# Patient Record
Sex: MALE | Race: WHITE | Employment: FULL TIME | URBAN - METROPOLITAN AREA
[De-identification: names, ages, dates, MRNs, and addresses within clinical notes are randomized per-mention and may not be internally consistent; named-entity substitution may affect disease eponyms.]

---

## 2017-09-25 ENCOUNTER — HOSPITAL ENCOUNTER (OUTPATIENT)
Dept: LAB | Age: 57
Discharge: HOME | End: 2017-09-25

## 2017-09-25 PROCEDURE — 88305 TISSUE EXAM BY PATHOLOGIST: CPT | Performed by: INTERNAL MEDICINE

## 2018-08-07 ENCOUNTER — HOSPITAL ENCOUNTER (INPATIENT)
Age: 58
LOS: 1 days | Discharge: HOME | DRG: 247 | End: 2018-08-09
Attending: INTERNAL MEDICINE | Admitting: INTERNAL MEDICINE
Payer: COMMERCIAL

## 2018-08-07 PROBLEM — I10 ESSENTIAL HYPERTENSION WITH GOAL BLOOD PRESSURE LESS THAN 130/85: Status: ACTIVE | Noted: 2018-08-07

## 2018-08-07 PROBLEM — I20.0 UNSTABLE ANGINA (HCC): Status: ACTIVE | Noted: 2018-08-07

## 2018-08-07 PROBLEM — R94.31 ABNORMAL EKG: Status: ACTIVE | Noted: 2018-08-07

## 2018-08-07 PROBLEM — R07.9 CHEST PAIN: Status: ACTIVE | Noted: 2018-08-07

## 2018-08-07 PROBLEM — E11.9 TYPE 2 DIABETES MELLITUS WITHOUT COMPLICATION, WITHOUT LONG-TERM CURRENT USE OF INSULIN (HCC): Status: ACTIVE | Noted: 2018-08-07

## 2018-08-07 LAB
ANION GAP SERPL CALC-SCNC: 7 MMOL/L (ref 7–16)
BUN SERPL-MCNC: 15 MG/DL (ref 8–23)
CALCIUM SERPL-MCNC: 9.3 MG/DL (ref 8.3–10.4)
CHLORIDE SERPL-SCNC: 105 MMOL/L (ref 98–107)
CO2 SERPL-SCNC: 27 MMOL/L (ref 21–32)
CREAT SERPL-MCNC: 1.16 MG/DL (ref 0.8–1.5)
ERYTHROCYTE [DISTWIDTH] IN BLOOD BY AUTOMATED COUNT: 13.3 %
GLUCOSE BLD STRIP.AUTO-MCNC: 118 MG/DL (ref 65–100)
GLUCOSE SERPL-MCNC: 129 MG/DL (ref 65–100)
HCT VFR BLD AUTO: 42 % (ref 41.1–50.3)
HGB BLD-MCNC: 14.2 G/DL (ref 13.6–17.2)
MCH RBC QN AUTO: 30.7 PG (ref 26.1–32.9)
MCHC RBC AUTO-ENTMCNC: 33.8 G/DL (ref 31.4–35)
MCV RBC AUTO: 90.9 FL (ref 79.6–97.8)
NRBC # BLD: 0 K/UL (ref 0–0.2)
PLATELET # BLD AUTO: 315 K/UL (ref 150–450)
PMV BLD AUTO: 10.6 FL (ref 9.4–12.3)
POTASSIUM SERPL-SCNC: 4.7 MMOL/L (ref 3.5–5.1)
RBC # BLD AUTO: 4.62 M/UL (ref 4.23–5.6)
SODIUM SERPL-SCNC: 139 MMOL/L (ref 136–145)
TROPONIN I SERPL-MCNC: 1.99 NG/ML (ref 0.02–0.05)
WBC # BLD AUTO: 7.3 K/UL (ref 4.3–11.1)

## 2018-08-07 PROCEDURE — 99218 HC RM OBSERVATION: CPT

## 2018-08-07 PROCEDURE — 82962 GLUCOSE BLOOD TEST: CPT

## 2018-08-07 PROCEDURE — 84484 ASSAY OF TROPONIN QUANT: CPT

## 2018-08-07 PROCEDURE — 74011250637 HC RX REV CODE- 250/637: Performed by: NURSE PRACTITIONER

## 2018-08-07 PROCEDURE — 80048 BASIC METABOLIC PNL TOTAL CA: CPT

## 2018-08-07 PROCEDURE — 85027 COMPLETE CBC AUTOMATED: CPT

## 2018-08-07 RX ORDER — RANITIDINE 150 MG/1
150 TABLET, FILM COATED ORAL 2 TIMES DAILY
COMMUNITY
End: 2019-11-14

## 2018-08-07 RX ORDER — ACETAMINOPHEN 325 MG/1
650 TABLET ORAL
Status: DISCONTINUED | OUTPATIENT
Start: 2018-08-07 | End: 2018-08-08 | Stop reason: SDUPTHER

## 2018-08-07 RX ORDER — ATORVASTATIN CALCIUM 10 MG/1
20 TABLET, FILM COATED ORAL DAILY
Status: DISCONTINUED | OUTPATIENT
Start: 2018-08-08 | End: 2018-08-09 | Stop reason: HOSPADM

## 2018-08-07 RX ORDER — MORPHINE SULFATE 2 MG/ML
2 INJECTION, SOLUTION INTRAMUSCULAR; INTRAVENOUS
Status: DISCONTINUED | OUTPATIENT
Start: 2018-08-07 | End: 2018-08-08 | Stop reason: SDUPTHER

## 2018-08-07 RX ORDER — FAMOTIDINE 20 MG/1
20 TABLET, FILM COATED ORAL 2 TIMES DAILY
Status: DISCONTINUED | OUTPATIENT
Start: 2018-08-07 | End: 2018-08-09 | Stop reason: HOSPADM

## 2018-08-07 RX ORDER — METOPROLOL TARTRATE 25 MG/1
25 TABLET, FILM COATED ORAL EVERY 12 HOURS
Status: DISCONTINUED | OUTPATIENT
Start: 2018-08-07 | End: 2018-08-09 | Stop reason: HOSPADM

## 2018-08-07 RX ORDER — SODIUM CHLORIDE 9 MG/ML
100 INJECTION, SOLUTION INTRAVENOUS CONTINUOUS
Status: DISCONTINUED | OUTPATIENT
Start: 2018-08-08 | End: 2018-08-09 | Stop reason: HOSPADM

## 2018-08-07 RX ORDER — SODIUM CHLORIDE 0.9 % (FLUSH) 0.9 %
5-10 SYRINGE (ML) INJECTION AS NEEDED
Status: DISCONTINUED | OUTPATIENT
Start: 2018-08-07 | End: 2018-08-09 | Stop reason: HOSPADM

## 2018-08-07 RX ORDER — HYDROCODONE BITARTRATE AND ACETAMINOPHEN 5; 325 MG/1; MG/1
1 TABLET ORAL
Status: DISCONTINUED | OUTPATIENT
Start: 2018-08-07 | End: 2018-08-09 | Stop reason: HOSPADM

## 2018-08-07 RX ORDER — GUAIFENESIN 100 MG/5ML
81 LIQUID (ML) ORAL DAILY
Status: DISCONTINUED | OUTPATIENT
Start: 2018-08-08 | End: 2018-08-09 | Stop reason: HOSPADM

## 2018-08-07 RX ORDER — ACETAMINOPHEN 160 MG/5ML
200 SUSPENSION, ORAL (FINAL DOSE FORM) ORAL DAILY
COMMUNITY

## 2018-08-07 RX ORDER — LISINOPRIL 20 MG/1
20 TABLET ORAL DAILY
Status: DISCONTINUED | OUTPATIENT
Start: 2018-08-08 | End: 2018-08-09 | Stop reason: HOSPADM

## 2018-08-07 RX ORDER — NITROGLYCERIN 0.4 MG/1
0.4 TABLET SUBLINGUAL
Status: DISCONTINUED | OUTPATIENT
Start: 2018-08-07 | End: 2018-08-08 | Stop reason: SDUPTHER

## 2018-08-07 RX ORDER — INSULIN LISPRO 100 [IU]/ML
INJECTION, SOLUTION INTRAVENOUS; SUBCUTANEOUS
Status: DISCONTINUED | OUTPATIENT
Start: 2018-08-07 | End: 2018-08-09 | Stop reason: HOSPADM

## 2018-08-07 RX ORDER — BISMUTH SUBSALICYLATE 262 MG
1 TABLET,CHEWABLE ORAL DAILY
COMMUNITY

## 2018-08-07 RX ADMIN — FAMOTIDINE 20 MG: 20 TABLET ORAL at 18:24

## 2018-08-07 RX ADMIN — NITROGLYCERIN 1 INCH: 20 OINTMENT TOPICAL at 18:23

## 2018-08-07 RX ADMIN — METOPROLOL TARTRATE 25 MG: 25 TABLET ORAL at 20:47

## 2018-08-07 NOTE — PROGRESS NOTES
Bedside and Verbal shift change report given to Rox Valles RN (oncoming nurse) by self Alisiaenma Buttsg nurse). Report included the following information SBAR, Kardex, MAR and Recent Results.

## 2018-08-07 NOTE — PROGRESS NOTES
Bedside and Verbal shift change report given to self (oncoming nurse) by Marquis Allison (offgoing nurse). Report included the following information SBAR, Kardex and Recent Results.

## 2018-08-07 NOTE — IP AVS SNAPSHOT
[address] [address] [facility] [address] 
[phone] Patient: Abbie Hinds MRN: [id number] TLY:[birth year] About your hospitalization You were admitted on:  August 7, 2018 You last received care in the:  [facility] 3 CLINICAL OBSERVATION You were discharged on:  August 9, 2018 Why you were hospitalized Your primary diagnosis was:  Not on File Your diagnoses also included:  Unstable Angina (Hcc), Nstemi (Non-St Elevated Myocardial Infarction) (Hcc) Follow-up Information Follow up With Details Comments Contact Info Marit Nissen, MD On 8/23/2018 TC-14 at 2pm in [place] office  [address] [facility] [id number] [phone] Hugh Carpio MD  As needed [address] Partner MD 
[facility] [address] 
[phone] Your Scheduled Appointments Thursday August 23, 2018  2:15 PM EDT TRANSITIONAL CARE with Marit Nissen, MD  
[facility] ([address] [place] [address]) [address] 
[address] [place] [facility]  
[phone] Discharge Orders Procedure Order Date Status Priority Quantity Spec Type Associated Dx REFERRAL TO CARDIAC [facility] 08/09/18 1619 Normal Routine 1 A check lauren indicates which time of day the medication should be taken. My Medications START taking these medications Instructions Each Dose to Equal  
 Morning Noon Evening Bedtime  
 aspirin 81 mg chewable tablet Start taking on:  8/10/2018 Replaces:  aspirin 325 mg tablet Take 1 Tab by mouth daily. 81 mg  
    
  
   
   
   
  
 metoprolol tartrate 25 mg tablet Commonly known as:  LOPRESSOR Take 1 Tab by mouth every twelve (12) hours. 25 mg  
    
  
   
   
  
   
  
 nitroglycerin 0.4 mg SL tablet Commonly known as:  NITROSTAT  
   
 1 Tab by SubLINGual route every five (5) minutes as needed for Chest Pain. Up to 3 doses. 0.4 mg  
    
   
   
   
  
 ticagrelor 90 mg tablet Commonly known as:  [place] Take 1 Tab by mouth every twelve (12) hours every twelve (12) hours. 90 mg CONTINUE taking these medications Instructions Each Dose to Equal  
 Morning Noon Evening Bedtime  
 atorvastatin 20 mg tablet Commonly known as:  LIPITOR Take  by mouth daily. CO Q-10 200 mg capsule Generic drug:  coenzyme Q-10 Take 200 mg by mouth daily. 200 mg  
    
  
   
   
   
  
 fish oil-dha-epa 1,200-144-216 mg Cap Take  by mouth. fluocinoNIDE 0.05 % ointment Commonly known as:  LIDEX GARLIC PO Take  by mouth.  
     
   
   
   
  
 lisinopril 20 mg tablet Commonly known as:  Melba Lights Take 20 mg by mouth daily. 20 mg  
    
  
   
   
   
  
 metFORMIN 500 mg Tg24 24 hour tablet Commonly known asMira Sprain ER Take  by mouth.  
     
   
   
   
  
 multivitamin tablet Commonly known as:  ONE A DAY Take 1 Tab by mouth daily. 1 Tab Omega-3 Fatty Acids 60- mg Cpdr  
   
 Take  by mouth. raNITIdine 150 mg tablet Commonly known as:  ZANTAC Take 150 mg by mouth two (2) times a day. 150 mg  
    
  
   
   
  
   
  
 saw palmetto fruit 450 mg Cap Take  by mouth two (2) times a day. EGGD-EMWV-MTU-QFC-WBU-ZQEI-HOR PO Take  by mouth. STOP taking these medications   
 aspirin 325 mg tablet Commonly known as:  ASPIRIN Replaced by:  aspirin 81 mg chewable tablet CIALIS 5 mg tablet Generic drug:  tadalafil Where to Get Your Medications Information on where to get these meds will be given to you by the nurse or doctor. ! Ask your nurse or doctor about these medications  
  metoprolol tartrate 25 mg tablet  
 nitroglycerin 0.4 mg SL tablet  
 ticagrelor 90 mg tablet Discharge Instructions Cardiac Catheterization/Angiography Discharge Instructions *Check the puncture site frequently for swelling or bleeding. If you see any bleeding, lie down and apply pressure over the area with a clean town or washcloth. Notify your doctor for any redness, swelling, drainage or oozing from the puncture site. Notify your doctor for any fever or chills. *If the leg or arm with the puncture becomes cold, numb or painful, call Dr Laci Moe at  [phone]. *Activity should be limited for the next 48 hours. Climb stairs as little as possible and avoid any stooping, bending or strenuous activity for 48 hours. No heavy lifting (anything over 10 pounds) for three days. *Do not drive for 48 hours. *You may resume your usual diet. Drink more fluids than usual. 
 
*Have a responsible person drive you home and stay with you for at least 24 hours after your heart catheterization/angiography. *You may remove the bandage from your Right and Arm in 24 hours. You may shower in 24 hours. No tub baths, hot tubs or swimming for one week. Do not place any lotions, creams, powders, ointments over the puncture site for one week. You may place a clean band-aid over the puncture site each day for 5 days. Change this daily. Percutaneous Coronary Intervention: What to Expect at [facility] Your Recovery Percutaneous coronary intervention (PCI) is the name for procedures that are used to open a narrowed or blocked coronary artery. The two most common PCI procedures are coronary angioplasty and coronary stent placement. Your groin or arm may have a bruise and feel sore for a day or two after a percutaneous coronary intervention (PCI). You can do light activities around the house, but nothing strenuous for several days. This care sheet gives you a general idea about how long it will take for you to recover. But each person recovers at a different pace. Follow the steps below to get better as quickly as possible. How can you care for yourself at home? Activity 
  · If the doctor gave you a sedative: ¨ For 24 hours, don't do anything that requires attention to detail. It takes time for the medicine's effects to completely wear off. ¨ For your safety, do not drive or operate any machinery that could be dangerous. Wait until the medicine wears off and you can think clearly and react easily.  
  · Do not do strenuous exercise and do not lift, pull, or push anything heavy until your doctor says it is okay. This may be for a day or two. You can walk around the house and do light activity, such as cooking.  
  · If the catheter was placed in your groin, try not to walk up stairs for the first couple of days.  
  · If the catheter was placed in your arm near your wrist, do not bend your wrist deeply for the first couple of days. Be careful using your hand to get into and out of a chair or bed.  
  · Carry your stent identification card with you at all times.  
  · If your doctor recommends it, get more exercise. Walking is a good choice. Bit by bit, increase the amount you walk every day. Try for at least 30 minutes on most days of the week. Diet 
  · Drink plenty of fluids to help your body flush out the dye. If you have kidney, heart, or liver disease and have to limit fluids, talk with your doctor before you increase the amount of fluids you drink.  
  · Keep eating a heart-healthy diet that has lots of fruits, vegetables, and whole grains. If you have not been eating this way, talk to your doctor. You also may want to talk to a dietitian. This expert can help you to learn about healthy foods and plan meals. Medicines 
  · Your doctor will tell you if and when you can restart your medicines. He or she will also give you instructions about taking any new medicines.  
  · If you take blood thinners, such as warfarin (Coumadin), clopidogrel (Plavix), or aspirin, be sure to talk to your doctor. He or she will tell you if and when to start taking those medicines again. Make sure that you understand exactly what your doctor wants you to do.  
  · Your doctor will prescribe blood-thinning medicines. You will likely take aspirin plus another antiplatelet, such as clopidogrel (Plavix). It is very important that you take these medicines exactly as directed. These medicines help keep the coronary artery open and reduce your risk of a heart attack.  
  · Call your doctor if you think you are having a problem with your medicine.  
 Care of the catheter site 
  · For 1 or 2 days, keep a bandage over the spot where the catheter was inserted. The bandage probably will fall off in this time.  
  · Put ice or a cold pack on the area for 10 to 20 minutes at a time to help with soreness or swelling. Put a thin cloth between the ice and your skin.  
  · You may shower 24 to 48 hours after the procedure, if your doctor okays it. Pat the incision dry.  
  · Do not soak the catheter site until it is healed. Don't take a bath for 1 week, or until your doctor tells you it is okay.  
  · If you are bleeding, lie down and press on the area for 15 minutes to try to make it stop. If the bleeding does not stop, call your doctor or seek immediate medical care. Follow-up care is a key part of your treatment and safety. Be sure to make and go to all appointments, and call your doctor if you are having problems. It's also a good idea to know your test results and keep a list of the medicines you take. When should you call for help? Call 911 anytime you think you may need emergency care. For example, call if: 
  · You passed out (lost consciousness).  
  · You have severe trouble breathing.   · You have sudden chest pain and shortness of breath, or you cough up blood.  
  · You have symptoms of a heart attack, such as: ¨ Chest pain or pressure. ¨ Sweating. ¨ Shortness of breath. ¨ Nausea or vomiting. ¨ Pain that spreads from the chest to the neck, jaw, or one or both shoulders or arms. ¨ Dizziness or lightheadedness. ¨ A fast or uneven pulse. After calling 911, chew 1 adult-strength aspirin. Wait for an ambulance. Do not try to drive yourself.  
  · You have been diagnosed with angina, and you have angina symptoms that do not go away with rest or are not getting better within 5 minutes after you take one dose of nitroglycerin.  
 Call your doctor now or seek immediate medical care if: 
  · You are bleeding from the area where the catheter was put in your artery.  
  · You have a fast-growing, painful lump at the catheter site.  
  · You have signs of infection, such as: 
¨ Increased pain, swelling, warmth, or redness. ¨ Red streaks leading from the catheter site. ¨ Pus draining from the catheter site. ¨ A fever.  
  · Your leg or arm looks blue or feels cold, numb, or tingly.  
 Watch closely for changes in your health, and be sure to contact your doctor if you have any problems. Where can you learn more? Go to http://bailey-sury.info/. Enter Z194 in the search box to learn more about \"Percutaneous Coronary Intervention: What to Expect at Home. \" Current as of: December 6, 2017 Content Version: 11.7 © 4209-1506 [employer]. Care instructions adapted under license by [employer] (which disclaims liability or warranty for this information). If you have questions about a medical condition or this instruction, always ask your healthcare professional. [address] [address] any warranty or liability for your use of this information. [employer] Announcement We are excited to announce that we are making your provider's discharge notes available to you in Plazapoints (Cuponium). You will see these notes when they are completed and signed by the physician that discharged you from your recent hospital stay. If you have any questions or concerns about any information you see in Plazapoints (Cuponium), please call the Health Information Department where you were seen or reach out to your Primary Care Provider for more information about your plan of care. Introducing [facility] & HEALTH SERVICES! [employer] introduces Plazapoints (Cuponium) patient portal. Now you can access parts of your medical record, email your doctor's office, and request medication refills online. 1. In your internet browser, go to [email or web address] 2. Click on the First Time User? Click Here link in the Sign In box. You will see the New Member Sign Up page. 3. Enter your Plazapoints (Cuponium) Access Code exactly as it appears below. You will not need to use this code after youve completed the sign-up process. If you do not sign up before the expiration date, you must request a new code. · Plazapoints (Cuponium) Access Code: [email or web address] Expires: 11/5/2018  8:39 AM 
 
4. Enter the last four digits of your Social Security Number (xxxx) and Date of Birth (mm/dd/yyyy) as indicated and click Submit. You will be taken to the next sign-up page. 5. Create a Plazapoints (Cuponium) ID. This will be your Plazapoints (Cuponium) login ID and cannot be changed, so think of one that is secure and easy to remember. 6. Create a Plazapoints (Cuponium) password. You can change your password at any time. 7. Enter your Password Reset Question and Answer. This can be used at a later time if you forget your password. 8. Enter your e-mail address. You will receive e-mail notification when new information is available in [address] 9. Click Sign Up. You can now view and download portions of your medical record.  
10. Click the Download Summary menu link to download a portable copy of your medical information. If you have questions, please visit the Frequently Asked Questions section of the [employer] website. Remember, yuback is NOT to be used for urgent needs. For medical emergencies, dial 911. Now available from your iPhone and Android! Introducing Rey Nuno As a [facility] patient, I wanted to make you aware of our electronic visit tool called Rey Nuno. Buzzoo 24/7 allows you to connect within minutes with a medical provider 24 hours a day, seven days a week via a mobile device or tablet or logging into a secure website from your computer. You can access Rey Nuno from anywhere in the [place]. A virtual visit might be right for you when you have a simple condition and feel like you just dont want to get out of bed, or cant get away from work for an appointment, when your regular [facility] provider is not available (evenings, weekends or holidays), or when youre out of town and need minor care. Electronic visits cost only $49 and if the [employer] provider determines a prescription is needed to treat your condition, one can be electronically transmitted to a nearby pharmacy*. Please take a moment to enroll today if you have not already done so. The enrollment process is free and takes just a few minutes. To enroll, please download the [employer] avery to your tablet or phone, or visit [email or web address] to enroll on your computer. And, as an [address] [place] [address] patient with a [employer] account, the results of your visits will be scanned into your electronic medical record and your primary care provider will be able to view the scanned results. We urge you to continue to see your regular [facility] provider for your ongoing medical care.   And while your primary care provider may not be the one available when you seek a Rey Nuno virtual visit, the peace of mind you get from getting a real diagnosis real time can be priceless. For more information on Rey Nuno, view our Frequently Asked Questions (FAQs) at [email or web address]. Sincerely, 
 
Maty Olivarez MD 
Chief Medical Officer [address] *:  certain medications cannot be prescribed via Rey Nuno Providers Seen During Your Hospitalization Provider Specialty Primary office phone Butch Doyle MD Cardiology [phone] Your Primary Care Physician (PCP) Primary Care Physician Office Phone Office Fax Nadia Rosaless [phone] You are allergic to the following Allergen Reactions Codeine Itching Recent Documentation Weight BMI Smoking Status 71.2 kg 24.57 kg/m2 Never Smoker Emergency Contacts Name Discharge Info Relation Home Work Mobile Ciara Gonzalez Spouse [3] [phone] Patient Belongings The following personal items are in your possession at time of discharge: 
  Dental Appliances: Uppers, Lowers, Partials (full upper and partial lower)  Visual Aid: With patient      Home Medications: None   Jewelry: Ring  Clothing: Pants, Shirt, Sweater    Other Valuables: Cell Phone (2) Discharge Instructions Attachments/References CARDIAC REHABILITATION (ENGLISH) Patient Handouts Cardiac Rehabilitation: Care Instructions Your Care Instructions Cardiac rehabilitation is a program for people who have a heart problem, such as a heart attack, heart failure, or a heart valve disease. The program includes exercise, lifestyle changes, education, and emotional support. Cardiac rehab can help you improve the quality of your life through better overall health. It can help you lose weight and feel better about yourself.  
On your cardiac rehab team, you may have your doctor, a nurse specialist, a dietitian, and a physical therapist. They will design your cardiac rehab program specifically for you. You will learn how to reduce your risk for heart problems, how to manage stress, and how to eat a heart-healthy diet. By the end of the program, you will be ready to maintain a healthier lifestyle on your own. Follow-up care is a key part of your treatment and safety. Be sure to make and go to all appointments, and call your doctor if you are having problems. It's also a good idea to know your test results and keep a list of the medicines you take. How can you care for yourself at home? · Take your medicines exactly as prescribed. Call your doctor if you think you are having a problem with your medicine. You will get more details on the specific medicines your doctor prescribes. · Weigh yourself every day if your doctor tells you to. Watch for sudden weight gain. Weigh yourself on the same scale with the same amount of clothing at the same time of day. · Plan your meals so that you are eating heart-healthy foods. ¨ Eat a variety of foods daily. Fresh fruits and vegetables and whole-grains are good choices. ¨ Limit your fat intake, especially saturated and trans fat. ¨ Limit salt (sodium). ¨ Increase fiber in your diet. ¨ Limit alcohol. · Learn how to take your pulse so that you can track your heart rate during exercise. · Always check with your doctor before you begin a new exercise program. 
· Warm up before you exercise and cool down afterward for at least 15 minutes each. This will help your heart gradually prepare for and recover from exercise and avoid pushing your heart too hard. · Stop exercising if you have any unusual discomfort, such as chest pain. · Do not smoke. Smoking can make heart problems worse. If you need help quitting, talk to your doctor about stop-smoking programs and medicines. These can increase your chances of quitting for good. When should you call for help? Call 911 anytime you think you may need emergency care. For example, call if: 
  · You have severe trouble breathing.  
  · You cough up pink, foamy mucus and you have trouble breathing.  
  · You have symptoms of a heart attack. These may include: ¨ Chest pain or pressure, or a strange feeling in the chest. 
¨ Sweating. ¨ Shortness of breath. ¨ Nausea or vomiting. ¨ Pain, pressure, or a strange feeling in the back, neck, jaw, or upper belly or in one or both shoulders or arms. ¨ Lightheadedness or sudden weakness. ¨ A fast or irregular heartbeat. After you call 911, the  may tell you to chew 1 adult-strength or 2 to 4 low-dose aspirin. Wait for an ambulance. Do not try to drive yourself.  
  · You have angina symptoms (such as chest pain or pressure) that do not go away with rest or are not getting better within 5 minutes after you take a dose of nitroglycerin.  
  · You have symptoms of a stroke. These may include: 
¨ Sudden numbness, tingling, weakness, or loss of movement in your face, arm, or leg, especially on only one side of your body. ¨ Sudden vision changes. ¨ Sudden trouble speaking. ¨ Sudden confusion or trouble understanding simple statements. ¨ Sudden problems with walking or balance. ¨ A sudden, severe headache that is different from past headaches.  
  · You passed out (lost consciousness).  
 Call your doctor now or seek immediate medical care if: 
  · You have new or increased shortness of breath.  
  · You are dizzy or lightheaded, or you feel like you may faint.  
  · You gain weight suddenly, such as more than 2 to 3 pounds in a day or 5 pounds in a week. (Your doctor may suggest a different range of weight gain.)  
  · You have increased swelling in your legs, ankles, or feet.  
 Watch closely for changes in your health, and be sure to contact your doctor if you have any problems. Where can you learn more? Go to http://mauricio.info/. Enter Z071 in the search box to learn more about \"Cardiac Rehabilitation: Care Instructions. \" Current as of: December 6, 2017 Content Version: 11.7 © 2006-2018 [facility]. Care instructions adapted under license by [employer] (which disclaims liability or warranty for this information). If you have questions about a medical condition or this instruction, always ask your healthcare professional. [address] any warranty or liability for your use of this information. Please provide this summary of care documentation to your next provider. Signatures-by signing, you are acknowledging that this After Visit Summary has been reviewed with you and you have received a copy. Patient Signature:  ____________________________________________________________ Date:  ____________________________________________________________  
  
[facility] Provider Signature:  ____________________________________________________________ Date:  ____________________________________________________________

## 2018-08-07 NOTE — PROGRESS NOTES
Dual skin assessment completed & reveals the following: no redness or breakdown noted to sacrum/coccyx. Heels dry & intact. Scaly rash to right knee, pt reports psoriasis. No other abnormalities.

## 2018-08-07 NOTE — IP AVS SNAPSHOT
[address] [id number] 
[phone] Patient: Pieter Khanna MRN: [id number] VIKKI:[id number] A check lauren indicates which time of day the medication should be taken. My Medications START taking these medications Instructions Each Dose to Equal  
 Morning Noon Evening Bedtime  
 aspirin 81 mg chewable tablet Start taking on:  8/10/2018 Replaces:  aspirin 325 mg tablet Take 1 Tab by mouth daily. 81 mg  
    
  
   
   
   
  
 metoprolol tartrate 25 mg tablet Commonly known as:  LOPRESSOR Take 1 Tab by mouth every twelve (12) hours. 25 mg  
    
  
   
   
  
   
  
 nitroglycerin 0.4 mg SL tablet Commonly known as:  NITROSTAT  
   
 1 Tab by SubLINGual route every five (5) minutes as needed for Chest Pain. Up to 3 doses. 0.4 mg  
    
   
   
   
  
 ticagrelor 90 mg tablet Commonly known as:  [employer] Take 1 Tab by mouth every twelve (12) hours every twelve (12) hours. 90 mg CONTINUE taking these medications Instructions Each Dose to Equal  
 Morning Noon Evening Bedtime  
 atorvastatin 20 mg tablet Commonly known as:  LIPITOR Take  by mouth daily. CO Q-10 200 mg capsule Generic drug:  coenzyme Q-10 Take 200 mg by mouth daily. 200 mg  
    
  
   
   
   
  
 fish oil-dha-epa 1,200-144-216 mg Cap Take  by mouth. fluocinoNIDE 0.05 % ointment Commonly known as:  LIDEX GARLIC PO Take  by mouth.  
     
   
   
   
  
 lisinopril 20 mg tablet Commonly known as:  Imani Perlita Take 20 mg by mouth daily. 20 mg  
    
  
   
   
   
  
 metFORMIN 500 mg Tg24 24 hour tablet Commonly known asOshyam Anaya ER Take  by mouth.  
     
   
   
   
  
 multivitamin tablet Commonly known as:  ONE A DAY  
   
 Take 1 Tab by mouth daily. 1 Tab Omega-3 Fatty Acids 60- mg Cpdr  
   
 Take  by mouth. raNITIdine 150 mg tablet Commonly known as:  ZANTAC Take 150 mg by mouth two (2) times a day. 150 mg  
    
  
   
   
  
   
  
 saw palmetto fruit 450 mg Cap Take  by mouth two (2) times a day. RONJ-BBBJ-IIL-HQZ-PJJ-HKAD-HOR PO Take  by mouth. STOP taking these medications   
 aspirin 325 mg tablet Commonly known as:  ASPIRIN Replaced by:  aspirin 81 mg chewable tablet CIALIS 5 mg tablet Generic drug:  tadalafil Where to Get Your Medications Information on where to get these meds will be given to you by the nurse or doctor. ! Ask your nurse or doctor about these medications  
  metoprolol tartrate 25 mg tablet  
 nitroglycerin 0.4 mg SL tablet  
 ticagrelor 90 mg tablet

## 2018-08-08 PROBLEM — I21.4 NSTEMI (NON-ST ELEVATED MYOCARDIAL INFARCTION) (HCC): Status: ACTIVE | Noted: 2018-08-08

## 2018-08-08 LAB
ACT BLD: 290 SECS (ref 70–128)
ACT BLD: 301 SECS (ref 70–128)
ANION GAP SERPL CALC-SCNC: 11 MMOL/L (ref 7–16)
APTT PPP: 111.3 SEC (ref 23.2–35.3)
ATRIAL RATE: 64 BPM
BUN SERPL-MCNC: 18 MG/DL (ref 8–23)
CALCIUM SERPL-MCNC: 8.6 MG/DL (ref 8.3–10.4)
CALCULATED P AXIS, ECG09: 49 DEGREES
CALCULATED R AXIS, ECG10: 32 DEGREES
CALCULATED T AXIS, ECG11: 15 DEGREES
CHLORIDE SERPL-SCNC: 107 MMOL/L (ref 98–107)
CHOLEST SERPL-MCNC: 165 MG/DL
CO2 SERPL-SCNC: 26 MMOL/L (ref 21–32)
CREAT SERPL-MCNC: 1.09 MG/DL (ref 0.8–1.5)
DIAGNOSIS, 93000: NORMAL
ERYTHROCYTE [DISTWIDTH] IN BLOOD BY AUTOMATED COUNT: 13.7 %
GLUCOSE BLD STRIP.AUTO-MCNC: 132 MG/DL (ref 65–100)
GLUCOSE BLD STRIP.AUTO-MCNC: 133 MG/DL (ref 65–100)
GLUCOSE BLD STRIP.AUTO-MCNC: 158 MG/DL (ref 65–100)
GLUCOSE BLD STRIP.AUTO-MCNC: 167 MG/DL (ref 65–100)
GLUCOSE SERPL-MCNC: 114 MG/DL (ref 65–100)
HCT VFR BLD AUTO: 41.7 % (ref 41.1–50.3)
HDLC SERPL-MCNC: 42 MG/DL (ref 40–60)
HDLC SERPL: 3.9 {RATIO}
HGB BLD-MCNC: 13.4 G/DL (ref 13.6–17.2)
LDLC SERPL CALC-MCNC: 113 MG/DL
LIPID PROFILE,FLP: ABNORMAL
MCH RBC QN AUTO: 30.5 PG (ref 26.1–32.9)
MCHC RBC AUTO-ENTMCNC: 32.1 G/DL (ref 31.4–35)
MCV RBC AUTO: 95 FL (ref 79.6–97.8)
NRBC # BLD: 0 K/UL (ref 0–0.2)
P-R INTERVAL, ECG05: 160 MS
PLATELET # BLD AUTO: 293 K/UL (ref 150–450)
PMV BLD AUTO: 10.8 FL (ref 9.4–12.3)
POTASSIUM SERPL-SCNC: 3.6 MMOL/L (ref 3.5–5.1)
Q-T INTERVAL, ECG07: 430 MS
QRS DURATION, ECG06: 100 MS
QTC CALCULATION (BEZET), ECG08: 443 MS
RBC # BLD AUTO: 4.39 M/UL (ref 4.23–5.6)
SODIUM SERPL-SCNC: 144 MMOL/L (ref 136–145)
TRIGL SERPL-MCNC: 50 MG/DL (ref 35–150)
TROPONIN I SERPL-MCNC: 2.21 NG/ML (ref 0.02–0.05)
TROPONIN I SERPL-MCNC: 2.33 NG/ML (ref 0.02–0.05)
VENTRICULAR RATE, ECG03: 64 BPM
VLDLC SERPL CALC-MCNC: 10 MG/DL (ref 6–23)
WBC # BLD AUTO: 7.5 K/UL (ref 4.3–11.1)

## 2018-08-08 PROCEDURE — C1769 GUIDE WIRE: HCPCS

## 2018-08-08 PROCEDURE — 85730 THROMBOPLASTIN TIME PARTIAL: CPT

## 2018-08-08 PROCEDURE — C1725 CATH, TRANSLUMIN NON-LASER: HCPCS

## 2018-08-08 PROCEDURE — C1894 INTRO/SHEATH, NON-LASER: HCPCS

## 2018-08-08 PROCEDURE — C1753 CATH, INTRAVAS ULTRASOUND: HCPCS

## 2018-08-08 PROCEDURE — 77030004559 HC CATH ANGI DX SUPT CARD -B

## 2018-08-08 PROCEDURE — B2111ZZ FLUOROSCOPY OF MULTIPLE CORONARY ARTERIES USING LOW OSMOLAR CONTRAST: ICD-10-PCS | Performed by: INTERNAL MEDICINE

## 2018-08-08 PROCEDURE — 82962 GLUCOSE BLOOD TEST: CPT

## 2018-08-08 PROCEDURE — 99152 MOD SED SAME PHYS/QHP 5/>YRS: CPT

## 2018-08-08 PROCEDURE — 74011000250 HC RX REV CODE- 250: Performed by: INTERNAL MEDICINE

## 2018-08-08 PROCEDURE — C1887 CATHETER, GUIDING: HCPCS

## 2018-08-08 PROCEDURE — C1874 STENT, COATED/COV W/DEL SYS: HCPCS

## 2018-08-08 PROCEDURE — 99153 MOD SED SAME PHYS/QHP EA: CPT

## 2018-08-08 PROCEDURE — 92928 PRQ TCAT PLMT NTRAC ST 1 LES: CPT

## 2018-08-08 PROCEDURE — 92978 ENDOLUMINL IVUS OCT C 1ST: CPT

## 2018-08-08 PROCEDURE — 99218 HC RM OBSERVATION: CPT

## 2018-08-08 PROCEDURE — 80048 BASIC METABOLIC PNL TOTAL CA: CPT

## 2018-08-08 PROCEDURE — 65660000000 HC RM CCU STEPDOWN

## 2018-08-08 PROCEDURE — 74011250636 HC RX REV CODE- 250/636: Performed by: NURSE PRACTITIONER

## 2018-08-08 PROCEDURE — 74011250636 HC RX REV CODE- 250/636: Performed by: INTERNAL MEDICINE

## 2018-08-08 PROCEDURE — 93005 ELECTROCARDIOGRAM TRACING: CPT | Performed by: INTERNAL MEDICINE

## 2018-08-08 PROCEDURE — 85347 COAGULATION TIME ACTIVATED: CPT

## 2018-08-08 PROCEDURE — 74011250637 HC RX REV CODE- 250/637: Performed by: NURSE PRACTITIONER

## 2018-08-08 PROCEDURE — 027035Z DILATION OF CORONARY ARTERY, ONE ARTERY WITH TWO DRUG-ELUTING INTRALUMINAL DEVICES, PERCUTANEOUS APPROACH: ICD-10-PCS | Performed by: INTERNAL MEDICINE

## 2018-08-08 PROCEDURE — 77030004558 HC CATH ANGI DX SUPR TORQ CARD -A

## 2018-08-08 PROCEDURE — 4A023N7 MEASUREMENT OF CARDIAC SAMPLING AND PRESSURE, LEFT HEART, PERCUTANEOUS APPROACH: ICD-10-PCS | Performed by: INTERNAL MEDICINE

## 2018-08-08 PROCEDURE — 74011636320 HC RX REV CODE- 636/320: Performed by: INTERNAL MEDICINE

## 2018-08-08 PROCEDURE — 80061 LIPID PANEL: CPT

## 2018-08-08 PROCEDURE — B2151ZZ FLUOROSCOPY OF LEFT HEART USING LOW OSMOLAR CONTRAST: ICD-10-PCS | Performed by: INTERNAL MEDICINE

## 2018-08-08 PROCEDURE — 93458 L HRT ARTERY/VENTRICLE ANGIO: CPT

## 2018-08-08 PROCEDURE — 36415 COLL VENOUS BLD VENIPUNCTURE: CPT

## 2018-08-08 PROCEDURE — 77030019569 HC BND COMPR RAD TERU -B

## 2018-08-08 PROCEDURE — 84484 ASSAY OF TROPONIN QUANT: CPT

## 2018-08-08 PROCEDURE — 74011250636 HC RX REV CODE- 250/636

## 2018-08-08 PROCEDURE — 74011636637 HC RX REV CODE- 636/637: Performed by: NURSE PRACTITIONER

## 2018-08-08 PROCEDURE — 85027 COMPLETE CBC AUTOMATED: CPT

## 2018-08-08 PROCEDURE — 74011250637 HC RX REV CODE- 250/637: Performed by: INTERNAL MEDICINE

## 2018-08-08 RX ORDER — SODIUM CHLORIDE 0.9 % (FLUSH) 0.9 %
5-10 SYRINGE (ML) INJECTION EVERY 8 HOURS
Status: DISCONTINUED | OUTPATIENT
Start: 2018-08-08 | End: 2018-08-09 | Stop reason: HOSPADM

## 2018-08-08 RX ORDER — LIDOCAINE HYDROCHLORIDE 20 MG/ML
1-20 INJECTION, SOLUTION INFILTRATION; PERINEURAL
Status: DISCONTINUED | OUTPATIENT
Start: 2018-08-08 | End: 2018-08-08

## 2018-08-08 RX ORDER — HEPARIN SODIUM 5000 [USP'U]/100ML
12-25 INJECTION, SOLUTION INTRAVENOUS
Status: DISCONTINUED | OUTPATIENT
Start: 2018-08-08 | End: 2018-08-08

## 2018-08-08 RX ORDER — MIDAZOLAM HYDROCHLORIDE 1 MG/ML
.5-2 INJECTION, SOLUTION INTRAMUSCULAR; INTRAVENOUS
Status: DISCONTINUED | OUTPATIENT
Start: 2018-08-08 | End: 2018-08-08

## 2018-08-08 RX ORDER — LORAZEPAM 1 MG/1
1 TABLET ORAL
Status: DISCONTINUED | OUTPATIENT
Start: 2018-08-08 | End: 2018-08-09 | Stop reason: HOSPADM

## 2018-08-08 RX ORDER — HEPARIN SODIUM 10000 [USP'U]/ML
40-80 INJECTION, SOLUTION INTRAVENOUS; SUBCUTANEOUS
Status: DISCONTINUED | OUTPATIENT
Start: 2018-08-08 | End: 2018-08-08

## 2018-08-08 RX ORDER — NITROGLYCERIN 0.4 MG/1
0.4 TABLET SUBLINGUAL
Status: DISCONTINUED | OUTPATIENT
Start: 2018-08-08 | End: 2018-08-09 | Stop reason: HOSPADM

## 2018-08-08 RX ORDER — LIDOCAINE HYDROCHLORIDE 10 MG/ML
10-200 INJECTION INFILTRATION; PERINEURAL ONCE
Status: COMPLETED | OUTPATIENT
Start: 2018-08-08 | End: 2018-08-08

## 2018-08-08 RX ORDER — MORPHINE SULFATE 2 MG/ML
1 INJECTION, SOLUTION INTRAMUSCULAR; INTRAVENOUS
Status: DISCONTINUED | OUTPATIENT
Start: 2018-08-08 | End: 2018-08-09 | Stop reason: HOSPADM

## 2018-08-08 RX ORDER — SODIUM CHLORIDE 0.9 % (FLUSH) 0.9 %
5-10 SYRINGE (ML) INJECTION AS NEEDED
Status: DISCONTINUED | OUTPATIENT
Start: 2018-08-08 | End: 2018-08-09 | Stop reason: HOSPADM

## 2018-08-08 RX ORDER — ACETAMINOPHEN 325 MG/1
650 TABLET ORAL
Status: DISCONTINUED | OUTPATIENT
Start: 2018-08-08 | End: 2018-08-09 | Stop reason: HOSPADM

## 2018-08-08 RX ORDER — HEPARIN SODIUM 5000 [USP'U]/ML
4000 INJECTION, SOLUTION INTRAVENOUS; SUBCUTANEOUS ONCE
Status: COMPLETED | OUTPATIENT
Start: 2018-08-08 | End: 2018-08-08

## 2018-08-08 RX ORDER — HEPARIN SODIUM 200 [USP'U]/100ML
3 INJECTION, SOLUTION INTRAVENOUS CONTINUOUS
Status: DISCONTINUED | OUTPATIENT
Start: 2018-08-08 | End: 2018-08-08

## 2018-08-08 RX ORDER — SODIUM CHLORIDE 9 MG/ML
75 INJECTION, SOLUTION INTRAVENOUS CONTINUOUS
Status: DISCONTINUED | OUTPATIENT
Start: 2018-08-08 | End: 2018-08-09 | Stop reason: HOSPADM

## 2018-08-08 RX ORDER — FENTANYL CITRATE 50 UG/ML
25-50 INJECTION, SOLUTION INTRAMUSCULAR; INTRAVENOUS
Status: DISCONTINUED | OUTPATIENT
Start: 2018-08-08 | End: 2018-08-08

## 2018-08-08 RX ADMIN — NITROGLYCERIN 1 INCH: 20 OINTMENT TOPICAL at 05:45

## 2018-08-08 RX ADMIN — NITROGLYCERIN 0.15 MG: 200 INJECTION, SOLUTION INTRAVENOUS at 11:49

## 2018-08-08 RX ADMIN — FAMOTIDINE 20 MG: 20 TABLET ORAL at 08:30

## 2018-08-08 RX ADMIN — METOPROLOL TARTRATE 25 MG: 25 TABLET ORAL at 08:30

## 2018-08-08 RX ADMIN — NITROGLYCERIN 1 INCH: 20 OINTMENT TOPICAL at 00:16

## 2018-08-08 RX ADMIN — MIDAZOLAM HYDROCHLORIDE 2 MG: 1 INJECTION, SOLUTION INTRAMUSCULAR; INTRAVENOUS at 10:14

## 2018-08-08 RX ADMIN — TICAGRELOR 90 MG: 90 TABLET ORAL at 22:54

## 2018-08-08 RX ADMIN — IOPAMIDOL 200 ML: 755 INJECTION, SOLUTION INTRAVENOUS at 12:14

## 2018-08-08 RX ADMIN — ATORVASTATIN CALCIUM 20 MG: 10 TABLET, FILM COATED ORAL at 13:15

## 2018-08-08 RX ADMIN — SODIUM CHLORIDE 100 ML/HR: 900 INJECTION, SOLUTION INTRAVENOUS at 05:45

## 2018-08-08 RX ADMIN — HEPARIN SODIUM AND DEXTROSE 12 UNITS/KG/HR: 5000; 5 INJECTION INTRAVENOUS at 04:15

## 2018-08-08 RX ADMIN — FENTANYL CITRATE 50 MCG: 50 INJECTION, SOLUTION INTRAMUSCULAR; INTRAVENOUS at 11:34

## 2018-08-08 RX ADMIN — Medication 5 ML: at 22:56

## 2018-08-08 RX ADMIN — HEPARIN SODIUM 2 ML: 10000 INJECTION, SOLUTION INTRAVENOUS; SUBCUTANEOUS at 10:17

## 2018-08-08 RX ADMIN — Medication 10 ML: at 13:16

## 2018-08-08 RX ADMIN — FENTANYL CITRATE 50 MCG: 50 INJECTION, SOLUTION INTRAMUSCULAR; INTRAVENOUS at 10:14

## 2018-08-08 RX ADMIN — ASPIRIN 81 MG 81 MG: 81 TABLET ORAL at 08:30

## 2018-08-08 RX ADMIN — HEPARIN SODIUM 4000 UNITS: 5000 INJECTION, SOLUTION INTRAVENOUS; SUBCUTANEOUS at 04:15

## 2018-08-08 RX ADMIN — FAMOTIDINE 20 MG: 20 TABLET ORAL at 17:34

## 2018-08-08 RX ADMIN — LISINOPRIL 20 MG: 20 TABLET ORAL at 13:14

## 2018-08-08 RX ADMIN — TICAGRELOR 180 MG: 90 TABLET ORAL at 10:32

## 2018-08-08 RX ADMIN — INSULIN LISPRO 2 UNITS: 100 INJECTION, SOLUTION INTRAVENOUS; SUBCUTANEOUS at 22:55

## 2018-08-08 RX ADMIN — HEPARIN SODIUM 3 ML/HR: 5000 INJECTION, SOLUTION INTRAVENOUS; SUBCUTANEOUS at 09:58

## 2018-08-08 RX ADMIN — INSULIN LISPRO 2 UNITS: 100 INJECTION, SOLUTION INTRAVENOUS; SUBCUTANEOUS at 17:34

## 2018-08-08 RX ADMIN — METOPROLOL TARTRATE 25 MG: 25 TABLET ORAL at 22:55

## 2018-08-08 RX ADMIN — LIDOCAINE HYDROCHLORIDE 3 ML: 10 INJECTION, SOLUTION INFILTRATION; PERINEURAL at 10:16

## 2018-08-08 RX ADMIN — HEPARIN SODIUM 5000 UNITS: 10000 INJECTION INTRAVENOUS; SUBCUTANEOUS at 10:30

## 2018-08-08 NOTE — PROGRESS NOTES
Bedside and Verbal shift change report given to Oneyda Zavala (oncoming nurse) by self (offgoing nurse). Report included the following information SBAR, Kardex and Recent Results.

## 2018-08-08 NOTE — PROGRESS NOTES
Radial compression band removed at 1745 after slowly reducing air from 13 cc to zero as per hospital protocol. No bleeding or hematoma noted. 2 x 2 gauze with tegaderm placed over puncture site. The affected extremity is warm and dry to the touch. Frequent vital signs documented per flowsheet. Patient instructed to call if any bleeding noted on gauze. Patient verbalized understanding the nursing instructions.

## 2018-08-08 NOTE — PROGRESS NOTES
Verbal bedside report received from Yulisa Chand RN. Assumed care of patient. Heparin IV drip verified at bedside with outgoing RN.

## 2018-08-08 NOTE — PROGRESS NOTES
Initial visit made to patient and a prayer was provided. The patient was having a procedure and had three family members present. A  card was left.         [employer]

## 2018-08-08 NOTE — PROGRESS NOTES
[facility] [[email or web address]] CARDIOLOGY PROGRESS NOTE           8/8/2018 7:26 AM    Admit Date: 8/7/2018         Subjective: Presented with UA to clinic, admitted for semi-urgent LHC, troponin found positive at 2 and started on hep gtt, denies active CP    ROS:  Cardiovascular:  As noted above    Objective:      Vitals:    08/07/18 1935 08/07/18 2047 08/08/18 0016 08/08/18 0542   BP:  (!) 147/97 (!) 139/94 117/74   Pulse:  72 71 62   Resp:  18 18 18   Temp:  97.4 °F (36.3 °C) 97.9 °F (36.6 °C) 98.2 °F (36.8 °C)   SpO2: 98% 97% 97% 94%   Weight:    157 lb 6.4 oz (71.4 kg)       On telemetry: NSR      Physical Exam:  General: Well Developed, Well Nourished, No Acute Distress, Alert & Oriented x 3, Appropriate mood  Neck: supple, no JVD  Heart: S1S2 with RRR without murmurs or gallops  Lungs: Clear throughout auscultation bilaterally without adventitious sounds  Abd: soft, nontender, nondistended, with good bowel sounds  Ext: no edema bilaterally  Skin: warm and dry      Data Review:   Recent Labs      08/07/18   1752   NA  139   K  4.7   BUN  15   CREA  1.16   GLU  129*   WBC  7.3   HGB  14.2   HCT  42.0   PLT  315       Recent Labs      08/07/18   1752   TROIQ  1.99*         Assessment/Plan:     Active Problems:    Unstable angina (HCC) (8/7/2018)    1) NSTEMI - troponin was 1.99, hep gtt started, no current CP, will plan on cath.   Needs ASA/statin/bb  2) HTN - mildly elevated BP will adjust meds after cath  3) DM - SSI      Karlie Almaguer MD  8/8/2018 7:26 AM

## 2018-08-08 NOTE — PROGRESS NOTES
Pt admitted to 3rd floor tele for unstable angina. CM met with pt to discuss CM needs & DCP. Pt lives at home with spouse, indep with all ADLS. Pt has no DME. Pt has no difficulty with obtaining Rx or transport. Pt is employed [employer]. DCP home with spouse. CM to continue to monitor. Care Management Interventions  PCP Verified by CM:  Yes  Last Visit to PCP: 08/07/18  Mode of Transport at Discharge: Self  Transition of Care Consult (CM Consult): Discharge Planning  Discharge Durable Medical Equipment: No  Physical Therapy Consult: No  Occupational Therapy Consult: No  Current Support Network: Lives with Spouse  Confirm Follow Up Transport: Family  Plan discussed with Pt/Family/Caregiver: Yes  Freedom of Choice Offered: Yes  Discharge Location  Discharge Placement: Home

## 2018-08-08 NOTE — PROGRESS NOTES
Bedside and Verbal shift change report given to self (oncoming nurse) by Usama Levine RN (offgoing nurse). Report included the following information SBAR, Kardex, MAR and Recent Results.

## 2018-08-08 NOTE — PROGRESS NOTES
TRANSFER - IN REPORT:    Verbal report received from KEATON Varela on [employer]  being received from [address] [place] [address] for routine progression of care. Report consisted of patients Situation, Background, Assessment and   Recommendations(SBAR). Information from the following reports was reviewed: Kardex, Procedure Summary, MAR and Recent Results. Opportunity for questions and clarification was provided. Assessment completed upon patients arrival to unit and care assumed. Patient received to room 327 and assessment completed. Patient connected to telemetry monitor and eagle with BP cycling every 15 minutes. Patient oriented to room and plan of care reviewed. Patient voiced understanding of keeping wrist immobilized. Right radial site benign, dressing dry and intact, no hematoma; R band in place. Patient provided with clear liquids. Patient aware to use call light to communicate needs. Instructed patient to not use arm for any pushing or pulling.

## 2018-08-08 NOTE — PROGRESS NOTES
TRANSFER - OUT REPORT:    Verbal report given to Hannah Mckenzie RN and Bc RN on [employer]  being transferred to CPRU/ 3 Tele for routine progression of care       Report consisted of patients Situation, Background, Assessment and Recommendations(SBAR). Information from the following report(s) SBAR, Kardex, Procedure Summary and MAR was reviewed with the receiving nurse. Opportunity for questions and clarification was provided.       [facility] with Dr Law Rounds x2   5000 heparin  180 brilinta   @ 1127  2 versed  100 fentanyl  Right radial Rband 13ml at [address]

## 2018-08-08 NOTE — PROCEDURES
Brief Cardiac Procedure Note    Patient: Destiney Jiang MRN: [id number]  SSN: [id number]    YOB: 1960  Age: 59 y.o. Sex: male      Date of Procedure: 8/8/2018     Pre-procedure Diagnosis: NSTEMI    Post-procedure Diagnosis: NSTEMI of the RCA    Reason for Procedure: ACS < or = 24 Hours    Procedure: Left Heart Catheterization with Percutaneous Coronary Intervention    Brief Description of Procedure: LHC via R radial artery, PCI to RCA, IVUS RCA    Performed By: Sonia Boykin MD     Assistants: None    Anesthesia: Moderate Sedation    Estimated Blood Loss: Less than 10 mL      Specimens: None    Implants: None    Findings: LM normal, LAD and Circ luminal irregs no greater than 20%, % prox with L to R collateral flow. Complications: None    Recommendations: DAPT x 12 months, statin, bb therapy. Will check echo post procedure.     Signed By: Sonia Boykin MD     August 8, 2018

## 2018-08-08 NOTE — PROGRESS NOTES
Verbal bedside report given to Paul Lee oncoming RN. Patient's situation, background, assessment and recommendations provided. Opportunity for questions provided. Oncoming RN assumed care of patient. R. radial site visualized.

## 2018-08-08 NOTE — PROGRESS NOTES
Patient has Troponins ordered Q6. Last troponin resulted at 1900. Lab called and notified to come draw repeat troponin. Notified Susan Russell and order was placed to start heparin gtt.

## 2018-08-09 VITALS
RESPIRATION RATE: 18 BRPM | TEMPERATURE: 97.4 F | WEIGHT: 156.9 LBS | BODY MASS INDEX: 24.57 KG/M2 | OXYGEN SATURATION: 98 % | DIASTOLIC BLOOD PRESSURE: 84 MMHG | SYSTOLIC BLOOD PRESSURE: 125 MMHG | HEART RATE: 58 BPM

## 2018-08-09 LAB
ANION GAP SERPL CALC-SCNC: 10 MMOL/L (ref 7–16)
ATRIAL RATE: 66 BPM
BASOPHILS # BLD: 0.1 K/UL
BASOPHILS NFR BLD: 1 % (ref 0–2)
BUN SERPL-MCNC: 16 MG/DL (ref 8–23)
CALCIUM SERPL-MCNC: 8.6 MG/DL (ref 8.3–10.4)
CALCULATED P AXIS, ECG09: 74 DEGREES
CALCULATED R AXIS, ECG10: 41 DEGREES
CALCULATED T AXIS, ECG11: -2 DEGREES
CHLORIDE SERPL-SCNC: 109 MMOL/L (ref 98–107)
CO2 SERPL-SCNC: 24 MMOL/L (ref 21–32)
CREAT SERPL-MCNC: 0.97 MG/DL (ref 0.8–1.5)
DIAGNOSIS, 93000: NORMAL
DIFFERENTIAL METHOD BLD: NORMAL
EOSINOPHIL # BLD: 0.4 K/UL
EOSINOPHIL NFR BLD: 5 % (ref 0.5–7.8)
ERYTHROCYTE [DISTWIDTH] IN BLOOD BY AUTOMATED COUNT: 13.9 %
GLUCOSE BLD STRIP.AUTO-MCNC: 108 MG/DL (ref 65–100)
GLUCOSE BLD STRIP.AUTO-MCNC: 221 MG/DL (ref 65–100)
GLUCOSE SERPL-MCNC: 108 MG/DL (ref 65–100)
HCT VFR BLD AUTO: 42.9 % (ref 41.1–50.3)
HGB BLD-MCNC: 14 G/DL (ref 13.6–17.2)
IMM GRANULOCYTES # BLD: 0 K/UL
IMM GRANULOCYTES NFR BLD AUTO: 0 % (ref 0–5)
LYMPHOCYTES # BLD: 1.7 K/UL
LYMPHOCYTES NFR BLD: 18 % (ref 13–44)
MCH RBC QN AUTO: 30.7 PG (ref 26.1–32.9)
MCHC RBC AUTO-ENTMCNC: 32.6 G/DL (ref 31.4–35)
MCV RBC AUTO: 94.1 FL (ref 79.6–97.8)
MONOCYTES # BLD: 0.6 K/UL
MONOCYTES NFR BLD: 7 % (ref 4–12)
NEUTS SEG # BLD: 6.7 K/UL
NEUTS SEG NFR BLD: 70 % (ref 43–78)
NRBC # BLD: 0 K/UL (ref 0–0.2)
P-R INTERVAL, ECG05: 168 MS
PLATELET # BLD AUTO: 291 K/UL (ref 150–450)
PMV BLD AUTO: 10.2 FL (ref 9.4–12.3)
POTASSIUM SERPL-SCNC: 3.8 MMOL/L (ref 3.5–5.1)
Q-T INTERVAL, ECG07: 438 MS
QRS DURATION, ECG06: 104 MS
QTC CALCULATION (BEZET), ECG08: 459 MS
RBC # BLD AUTO: 4.56 M/UL (ref 4.23–5.6)
SODIUM SERPL-SCNC: 143 MMOL/L (ref 136–145)
VENTRICULAR RATE, ECG03: 66 BPM
WBC # BLD AUTO: 9.5 K/UL (ref 4.3–11.1)

## 2018-08-09 PROCEDURE — 93005 ELECTROCARDIOGRAM TRACING: CPT | Performed by: INTERNAL MEDICINE

## 2018-08-09 PROCEDURE — 93306 TTE W/DOPPLER COMPLETE: CPT

## 2018-08-09 PROCEDURE — 80048 BASIC METABOLIC PNL TOTAL CA: CPT

## 2018-08-09 PROCEDURE — 82962 GLUCOSE BLOOD TEST: CPT

## 2018-08-09 PROCEDURE — 74011250637 HC RX REV CODE- 250/637: Performed by: NURSE PRACTITIONER

## 2018-08-09 PROCEDURE — 85025 COMPLETE CBC W/AUTO DIFF WBC: CPT

## 2018-08-09 PROCEDURE — 36415 COLL VENOUS BLD VENIPUNCTURE: CPT

## 2018-08-09 PROCEDURE — 74011636637 HC RX REV CODE- 636/637: Performed by: NURSE PRACTITIONER

## 2018-08-09 PROCEDURE — 74011250637 HC RX REV CODE- 250/637: Performed by: INTERNAL MEDICINE

## 2018-08-09 RX ORDER — GUAIFENESIN 100 MG/5ML
81 LIQUID (ML) ORAL DAILY
Status: SHIPPED | COMMUNITY
Start: 2018-08-10

## 2018-08-09 RX ORDER — METOPROLOL TARTRATE 25 MG/1
25 TABLET, FILM COATED ORAL EVERY 12 HOURS
Qty: 60 TAB | Refills: 11 | Status: SHIPPED | OUTPATIENT
Start: 2018-08-09 | End: 2018-08-09

## 2018-08-09 RX ORDER — METOPROLOL TARTRATE 25 MG/1
25 TABLET, FILM COATED ORAL EVERY 12 HOURS
Qty: 60 TAB | Refills: 11 | Status: SHIPPED | OUTPATIENT
Start: 2018-08-09 | End: 2018-08-23 | Stop reason: ALTCHOICE

## 2018-08-09 RX ORDER — NITROGLYCERIN 0.4 MG/1
0.4 TABLET SUBLINGUAL
Qty: 1 BOTTLE | Refills: 11 | Status: SHIPPED | OUTPATIENT
Start: 2018-08-09 | End: 2021-05-18 | Stop reason: SDUPTHER

## 2018-08-09 RX ORDER — NITROGLYCERIN 0.4 MG/1
0.4 TABLET SUBLINGUAL
Qty: 1 BOTTLE | Refills: 11 | Status: SHIPPED | OUTPATIENT
Start: 2018-08-09 | End: 2018-08-09

## 2018-08-09 RX ADMIN — ATORVASTATIN CALCIUM 20 MG: 10 TABLET, FILM COATED ORAL at 09:20

## 2018-08-09 RX ADMIN — INSULIN LISPRO 4 UNITS: 100 INJECTION, SOLUTION INTRAVENOUS; SUBCUTANEOUS at 12:12

## 2018-08-09 RX ADMIN — Medication 5 ML: at 06:00

## 2018-08-09 RX ADMIN — LISINOPRIL 20 MG: 20 TABLET ORAL at 09:20

## 2018-08-09 RX ADMIN — FAMOTIDINE 20 MG: 20 TABLET ORAL at 09:21

## 2018-08-09 RX ADMIN — TICAGRELOR 90 MG: 90 TABLET ORAL at 11:07

## 2018-08-09 RX ADMIN — METOPROLOL TARTRATE 25 MG: 25 TABLET ORAL at 09:21

## 2018-08-09 RX ADMIN — ASPIRIN 81 MG 81 MG: 81 TABLET ORAL at 09:21

## 2018-08-09 NOTE — DISCHARGE SUMMARY
[address] [place] [address] Cardiology Discharge Summary     Patient ID:  Lindsay Lau  [id number]  52 y.o.  1960    Admit date: 8/7/2018    Discharge date:  8/9/18    Admitting Physician: Caroline Rod MD     Discharge Physician: Hema Sherman NP/Dr. Nichole Valles    Admission Diagnoses: [place]  Unstable angina [facility])  NSTEMI (non-ST elevated myocardial infarction) [facility])    Discharge Diagnoses:    Diagnosis    [facility]STEMI (non-ST elevated myocardial infarction) ([facility]    Chest pain    Type 2 diabetes mellitus without complication, without long-term current use of insulin [facility]    Essential hypertension with goal blood pressure less than 130/85    Abnormal EKG    Unstable angina [facility])     Transitional care follow up with [address] [place] [address] Cardiology Dr. Yazan Triana Aug 23 at 2pm in [place] office     Cardiology Procedures this admission:  Left heart catheterization with PCI  EchoCardiogram  Consults: None    Hospital Course: Patient was seen at the office of [address] [place] [address] Cardiology by Dr. Yazan Triana for complaints of chest pain and shoulder pain. The patient was a direct admit for further care. The patient's troponin's were postive with peak at 2.33 consistent with NSTEMI. He was started on heparin shortly after admission and scheduled for [facility] the following morning. Patient underwent cardiac catheterization by Dr. Nichole Valles on 8/8/18. Patient was found to have a late presenting acute myocardial infarction that had 100% occlusion of RCA that was stented with a distal 3.0 x 38 Resolute NATHALY and a 3.5 x 18 Resolute NATHALY placed proximally close to the ostium of the right coronary with 0% residual stenosis. Patient tolerated the procedure well and was taken to the telemetry floor for recovery. An echocardiogram showed     -  Left ventricle: Systolic function was mildly reduced. Ejection fraction   was  estimated in the range of 45 % to 50 %. There was hypokinesis of the   basal-mid  inferior wall(s).     -  Inferior vena cava, hepatic veins: The inferior vena cava was mildly  dilated. The respirophasic change in diameter was more than 50%. -  Tricuspid valve: There was mild regurgitation. The morning of 8/9/18, the patient was up feeling well without any complaints of chest pain or shortness of breath. Patient's right radial cath site was clean, dry and intact without hematoma or bruit. Patient's labs were WNL. Patient was seen and examined by Dr. Grant Estevez and determined stable and ready for discharge. Patient was instructed on the importance of medication compliance including taking aspirin and Brilinta everyday without missing a dose. After receiving drug eluting stents, the patient will remain on dual anti-platelet therapy for 1 year. Indication for treatment and risk of dual antiplatelet therapy was discussed with the patient and he/she understands to seek medical care immediately if any signs of bleeding.  For maximized medical therapy for CAD and CHF patient will continue BB, ACE-I, and statin as well. The patient will close transitional care follow up with [facility] Cardiology Dr. Alex Pimentel Aug 23 at 2pm in [place] office  and has been referred to cardiac rehab. Resume glumetza Saturday Aug 11. DISPOSITION: The patient is being discharged home in stable condition on a low saturated fat, low cholesterol and low salt diet. The patient is instructed to advance activities as tolerated to the limit of fatigue or shortness of breath. The patient is instructed to avoid all heavy lifting for 5 days. The patient is instructed to watch the cath site for bleeding/oozing; if seen, the patient is instructed to apply firm pressure with a clean cloth and call [facility] Cardiology at [phone]. The patient is instructed to watch for signs of infection which include: increasing area of redness, fever/hot to touch or purulent drainage at the catheterization site. The patient is instructed not to soak in a bathtub for 7-10 days, but is cleared to shower.  The patient is instructed to call the office or return to the ER for immediate evaluation for any shortness of breath or chest pain not relieved by NTG. Discharge Exam:   Visit Vitals    /84 (BP 1 Location: Right arm, BP Patient Position: At rest)    Pulse (!) 58    Temp 97.4 °F (36.3 °C)    Resp 18    Wt 71.2 kg (156 lb 14.4 oz)    SpO2 98%    BMI 24.57 kg/m2     Patient has been seen by Dr. Albertina Aguayo: see his progress note for exam details. Recent Results (from the past 24 hour(s))   GLUCOSE, POC    Collection Time: 08/08/18  4:30 PM   Result Value Ref Range    Glucose (POC) 158 (H) 65 - 100 mg/dL   GLUCOSE, POC    Collection Time: 08/08/18  9:32 PM   Result Value Ref Range    Glucose (POC) 167 (H) 65 - 967 mg/dL   METABOLIC PANEL, BASIC    Collection Time: 08/09/18  5:01 AM   Result Value Ref Range    Sodium 143 136 - 145 mmol/L    Potassium 3.8 3.5 - 5.1 mmol/L    Chloride 109 (H) 98 - 107 mmol/L    CO2 24 21 - 32 mmol/L    Anion gap 10 7 - 16 mmol/L    Glucose 108 (H) 65 - 100 mg/dL    BUN 16 8 - 23 MG/DL    Creatinine 0.97 0.8 - 1.5 MG/DL    GFR est AA >60 >60 ml/min/1.73m2    GFR est non-AA >60 >60 ml/min/1.73m2    Calcium 8.6 8.3 - 10.4 MG/DL   CBC WITH AUTOMATED DIFF    Collection Time: 08/09/18  5:01 AM   Result Value Ref Range    WBC 9.5 4.3 - 11.1 K/uL    RBC 4.56 4.23 - 5.6 M/uL    HGB 14.0 13.6 - 17.2 g/dL    HCT 42.9 41.1 - 50.3 %    MCV 94.1 79.6 - 97.8 FL    MCH 30.7 26.1 - 32.9 PG    MCHC 32.6 31.4 - 35.0 g/dL    RDW 13.9 %    PLATELET 086 946 - 311 K/uL    MPV 10.2 9.4 - 12.3 FL    ABSOLUTE NRBC 0.00 0.0 - 0.2 K/uL    DF AUTOMATED      NEUTROPHILS 70 43 - 78 %    LYMPHOCYTES 18 13 - 44 %    MONOCYTES 7 4.0 - 12.0 %    EOSINOPHILS 5 0.5 - 7.8 %    BASOPHILS 1 0.0 - 2.0 %    IMMATURE GRANULOCYTES 0 0.0 - 5.0 %    ABS. NEUTROPHILS 6.7 K/UL    ABS. LYMPHOCYTES 1.7 K/UL    ABS. MONOCYTES 0.6 K/UL    ABS. EOSINOPHILS 0.4 K/UL    ABS. BASOPHILS 0.1 K/UL    ABS. IMM.  GRANS. 0.0 K/UL   EKG, 12 LEAD, INITIAL    Collection Time: 08/09/18  6:19 AM   Result Value Ref Range    Ventricular Rate 66 BPM    Atrial Rate 66 BPM    P-R Interval 168 ms    QRS Duration 104 ms    Q-T Interval 438 ms    QTC Calculation (Bezet) 459 ms    Calculated P Axis 74 degrees    Calculated R Axis 41 degrees    Calculated T Axis -2 degrees    Diagnosis       Normal sinus rhythm  Possible Inferior infarct , age undetermined  Abnormal ECG  When compared with ECG of 08-AUG-2018 13:32,  No significant change was found  Confirmed by LIDIA SAMUELS (), Bel Hernandez ([id number]) on 8/9/2018 7:56:45 AM     GLUCOSE, POC    Collection Time: 08/09/18  6:24 AM   Result Value Ref Range    Glucose (POC) 108 (H) 65 - 100 mg/dL   GLUCOSE, POC    Collection Time: 08/09/18 10:38 AM   Result Value Ref Range    Glucose (POC) 221 (H) 65 - 100 mg/dL         Patient Instructions:   Current Discharge Medication List      START taking these medications    Details   aspirin 81 mg chewable tablet Take 1 Tab by mouth daily. metoprolol tartrate (LOPRESSOR) 25 mg tablet Take 1 Tab by mouth every twelve (12) hours. Qty: 60 Tab, Refills: 11      ticagrelor (BRILINTA) 90 mg tablet Take 1 Tab by mouth every twelve (12) hours every twelve (12) hours. Qty: 60 Tab, Refills: 11      nitroglycerin (NITROSTAT) 0.4 mg SL tablet 1 Tab by SubLINGual route every five (5) minutes as needed for Chest Pain. Up to 3 doses. Qty: 1 Bottle, Refills: 11         CONTINUE these medications which have NOT CHANGED    Details   lisinopril (PRINIVIL, ZESTRIL) 20 mg tablet Take 20 mg by mouth daily. metFORMIN (GLUMETZA ER) 500 mg TG24 24 hour tablet Take  by mouth. atorvastatin (LIPITOR) 20 mg tablet Take  by mouth daily. fish oil-dha-epa 1,200-144-216 mg cap Take  by mouth. GARLIC PO Take  by mouth. saw palmetto fruit 450 mg cap Take  by mouth two (2) times a day.       DREU-CSEI-VGK-TUT-FFT-EFMG-HOR PO Take  by mouth.      multivitamin (ONE A DAY) tablet Take 1 Tab by mouth daily. Omega-3 Fatty Acids 60- mg cpDR Take  by mouth.      coenzyme Q-10 (CO Q-10) 200 mg capsule Take 200 mg by mouth daily. raNITIdine (ZANTAC) 150 mg tablet Take 150 mg by mouth two (2) times a day.       fluocinoNIDE (LIDEX) 0.05 % ointment          STOP taking these medications       aspirin (ASPIRIN) 325 mg tablet Comments:   Reason for Stopping:         CIALIS 5 mg tablet Comments:   Reason for Stopping:                 Signed:  SURINDER Lopez  8/9/2018  7:27 AM

## 2018-08-09 NOTE — PROCEDURES
[address]  MR#: [id number]  : 1960  ACCOUNT #: [de-identified]   DATE OF SERVICE: 2018    INDICATIONS:  Non-ST elevation MI.    BLOOD LOSS:  Approximately 10 mL. SEDATION:  The patient was given 2 mg of Versed and 100 mcg of fentanyl and monitored for conscious sedation beginning at 10:12 and ending at 12:20 by nurse, Rich Tang. SPECIMENS:  None. COMPLICATIONS:  None. ASSISTANTS:  None. Preprocedure timeout was completed. Mallampati score of 2. ASA score of 3. DESCRIPTION OF PROCEDURE:  After informed consent the patient was prepped in the usual sterile fashion. The right wrist was infiltrated with lidocaine. The right radial artery was accessed via the modified Seldinger technique with a 6-Lebanese sheath. A total of 200 mL of Visipaque contrast were used for the entire procedure. A Terumo band was used for hemostasis. CATHETERS USED:  A 6-Lebanese JL3.5, a 6-Lebanese angled pigtail catheter and a 6-Lebanese FR5 guide as well as a 6-Lebanese GuideLiner. FINDINGS:  LEFT VENTRICLE:    1. Left ventricular ejection fraction is estimated at 45% to 50% with inferior wall hypokinesis. 2.  LVEDP was measured at 21 mmHg. LEFT MAIN:  The left main was without angiographic evidence of atherosclerotic coronary artery disease. LEFT ANTERIOR DESCENDING: The left anterior descending artery was a diffusely diseased without a focal stenosis and luminal irregularities no greater than 20%. CIRCUMFLEX:  The circumflex artery diffusely was diseased without a focal stenosis and luminal irregularities no greater than 20%. RIGHT CORONARY:  The right coronary artery  was 100% proximally occluded with left to right collateral flow. INTERVENTION:  It was decided to intervene on the right coronary. The patient was given heparin to have to achieve an ACT greater than 300.   A 6-Lebanese FR5 guide was taken down to the ostium of the right coronary artery and a BMW wire was attempted to pass the proximal lesion. It was unable to pass so it was switched to a  50 wire. The  50 wire was able to get past the initial stenosis, however, with injections we were unable to see distal flow so a 2.0 balloon was taken down in an effort to dotter the lesion to get contrast to go beyond it. We were to get contrast to go past the lesion. Then a 1.2 balloon was taken down again in an effort to get contrast to go beyond the initial stenosis and we were unable to. Next, a dock wire was used to add on to the  50 wire and a [employer] catheter was taken down to the distal portion of where the wire sat. Hand injection showed that this was actually in a small side branch and not in the main lumen of the right coronary. There was some blushing where myocardium stained with the hand injection. Next, a second  50 was taken down and used to attempt to cross the lesion and this time it appeared to go into a right atrial branch. Because this was felt to be in a vessel it was decided to balloon proximal to this area to see if we could restore blood flow down the coronary. This was completed and showed that the previous wires were not in the main lumen of the right coronary artery. The  50 was then removed and was manipulated in order to go down the main lumen of the right coronary. At this point a 2.0 balloon was taken down and there were a series of inflations in order to restore blood flow to the right coronary. Next, a 2.75 x 20 compliant balloon was taken down and serial inflations again used to predilate the vessel. An IVUS catheter was then taken down in order to determine the size of the vessel and the distal vessel appeared to be about 3 mm in diameter so a 3.0 x 38 stent was then placed. A 3.5 x 18 stent was then placed proximally close to the ostium of the right coronary.   The IVUS catheter was then taken back down to evaluate stent apposition. At this point it was determined that the stents were slightly undersized so a 3.5 x 20 noncompliant balloon was taken distally and used to post-dilate the first stent and then a 4.0 x 18 noncompliant balloon was used to post-dilate the more proximal portions in the proximal stent. At the conclusion of the procedure there was excellent angiographic results. CONCLUSION:  This is a 56-year-old male who presented with likely a late presenting acute myocardial infarction who is now post-PCI of the right coronary. Recommendations include dual antiplatelet therapy V35 months, statin therapy, beta blocker and will need ACE inhibitor prior to discharge. We will check an echocardiogram to get a good evaluation of his wall motion before discharge.       MD KERRY Jaimes /   D: 08/08/2018 12:36     T: 08/08/2018 21:23  JOB #: [id number]

## 2018-08-09 NOTE — DISCHARGE INSTRUCTIONS
Cardiac Catheterization/Angiography Discharge Instructions    *Check the puncture site frequently for swelling or bleeding. If you see any bleeding, lie down and apply pressure over the area with a clean town or washcloth. Notify your doctor for any redness, swelling, drainage or oozing from the puncture site. Notify your doctor for any fever or chills. *If the leg or arm with the puncture becomes cold, numb or painful, call Dr Edgar Zelaya at  [phone]. *Activity should be limited for the next 48 hours. Climb stairs as little as possible and avoid any stooping, bending or strenuous activity for 48 hours. No heavy lifting (anything over 10 pounds) for three days. *Do not drive for 48 hours. *You may resume your usual diet. Drink more fluids than usual.    *Have a responsible person drive you home and stay with you for at least 24 hours after your heart catheterization/angiography. *You may remove the bandage from your Right and Arm in 24 hours. You may shower in 24 hours. No tub baths, hot tubs or swimming for one week. Do not place any lotions, creams, powders, ointments over the puncture site for one week. You may place a clean band-aid over the puncture site each day for 5 days. Change this daily. Percutaneous Coronary Intervention: What to Expect at [facility]    Percutaneous coronary intervention (PCI) is the name for procedures that are used to open a narrowed or blocked coronary artery. The two most common PCI procedures are coronary angioplasty and coronary stent placement. Your groin or arm may have a bruise and feel sore for a day or two after a percutaneous coronary intervention (PCI). You can do light activities around the house, but nothing strenuous for several days. This care sheet gives you a general idea about how long it will take for you to recover. But each person recovers at a different pace.  Follow the steps below to get better as quickly as possible. How can you care for yourself at home? Activity    · If the doctor gave you a sedative:  ¨ For 24 hours, don't do anything that requires attention to detail. It takes time for the medicine's effects to completely wear off. ¨ For your safety, do not drive or operate any machinery that could be dangerous. Wait until the medicine wears off and you can think clearly and react easily.     · Do not do strenuous exercise and do not lift, pull, or push anything heavy until your doctor says it is okay. This may be for a day or two. You can walk around the house and do light activity, such as cooking.     · If the catheter was placed in your groin, try not to walk up stairs for the first couple of days.     · If the catheter was placed in your arm near your wrist, do not bend your wrist deeply for the first couple of days. Be careful using your hand to get into and out of a chair or bed.     · Carry your stent identification card with you at all times.     · If your doctor recommends it, get more exercise. Walking is a good choice. Bit by bit, increase the amount you walk every day. Try for at least 30 minutes on most days of the week. Diet    · Drink plenty of fluids to help your body flush out the dye. If you have kidney, heart, or liver disease and have to limit fluids, talk with your doctor before you increase the amount of fluids you drink.     · Keep eating a heart-healthy diet that has lots of fruits, vegetables, and whole grains. If you have not been eating this way, talk to your doctor. You also may want to talk to a dietitian. This expert can help you to learn about healthy foods and plan meals. Medicines    · Your doctor will tell you if and when you can restart your medicines. He or she will also give you instructions about taking any new medicines.     · If you take blood thinners, such as warfarin (Coumadin), clopidogrel (Plavix), or aspirin, be sure to talk to your doctor.  He or she will tell you if and when to start taking those medicines again. Make sure that you understand exactly what your doctor wants you to do.     · Your doctor will prescribe blood-thinning medicines. You will likely take aspirin plus another antiplatelet, such as clopidogrel (Plavix). It is very important that you take these medicines exactly as directed. These medicines help keep the coronary artery open and reduce your risk of a heart attack.     · Call your doctor if you think you are having a problem with your medicine.    Care of the catheter site    · For 1 or 2 days, keep a bandage over the spot where the catheter was inserted. The bandage probably will fall off in this time.     · Put ice or a cold pack on the area for 10 to 20 minutes at a time to help with soreness or swelling. Put a thin cloth between the ice and your skin.     · You may shower 24 to 48 hours after the procedure, if your doctor okays it. Pat the incision dry.     · Do not soak the catheter site until it is healed. Don't take a bath for 1 week, or until your doctor tells you it is okay.     · If you are bleeding, lie down and press on the area for 15 minutes to try to make it stop. If the bleeding does not stop, call your doctor or seek immediate medical care. Follow-up care is a key part of your treatment and safety. Be sure to make and go to all appointments, and call your doctor if you are having problems. It's also a good idea to know your test results and keep a list of the medicines you take. When should you call for help? Call 911 anytime you think you may need emergency care. For example, call if:    · You passed out (lost consciousness).     · You have severe trouble breathing.     · You have sudden chest pain and shortness of breath, or you cough up blood.     · You have symptoms of a heart attack, such as:  ¨ Chest pain or pressure. ¨ Sweating. ¨ Shortness of breath. ¨ Nausea or vomiting.   ¨ Pain that spreads from the chest to the neck, jaw, or one or both shoulders or arms. ¨ Dizziness or lightheadedness. ¨ A fast or uneven pulse. After calling 911, chew 1 adult-strength aspirin. Wait for an ambulance. Do not try to drive yourself.     · You have been diagnosed with angina, and you have angina symptoms that do not go away with rest or are not getting better within 5 minutes after you take one dose of nitroglycerin.    Call your doctor now or seek immediate medical care if:    · You are bleeding from the area where the catheter was put in your artery.     · You have a fast-growing, painful lump at the catheter site.     · You have signs of infection, such as:  ¨ Increased pain, swelling, warmth, or redness. ¨ Red streaks leading from the catheter site. ¨ Pus draining from the catheter site. ¨ A fever.     · Your leg or arm looks blue or feels cold, numb, or tingly.    Watch closely for changes in your health, and be sure to contact your doctor if you have any problems. Where can you learn more? Go to http://bailey-sury.info/. Enter Z871 in the search box to learn more about \"Percutaneous Coronary Intervention: What to Expect at Home. \"  Current as of: December 6, 2017  Content Version: 11.7  © 4966-4439 [employer]. Care instructions adapted under license by [employer] (which disclaims liability or warranty for this information). If you have questions about a medical condition or this instruction, always ask your healthcare professional. [place] [address] any warranty or liability for your use of this information.

## 2018-08-09 NOTE — PROGRESS NOTES
Cardiac Rehab: Spoke with patient regarding referral to cardiac rehab. Patient meets admission criteria based on NSTEMI with PCI (8/8/18). Written information about Cardiac Rehab given and reviewed with patient. Discussed lifestyle modifications to promote cardiac wellness. Patient indicated that he may want to participate in the cardiac rehab program depending on the patient financial responsibility. The patient will be contacted with information he requested to determine if he can participate in Cardiac Rehab. His  Cardiologist is Dr. Arsenio Frias.       Thank you,  MARY TorresN, RN  Cardiopulmonary Rehabilitation Nurse Liaison  [facility]

## 2018-08-09 NOTE — PROGRESS NOTES
Problem: Falls - Risk of  Goal: *Absence of Falls  Document Sierra Fall Risk and appropriate interventions in the flowsheet. Outcome: Progressing Towards Goal  Fall Risk Interventions:     Pt progressing towards goal. No falls since admission. Bed low and locked. Call light within reach. Side rails x 2. Gripper socks applied. Personal belongings within reach. Pt verbalizes understanding to call for assistance.           Medication Interventions: Patient to call before getting OOB

## 2018-08-09 NOTE — PROGRESS NOTES
Bedside and Verbal shift change report given to Fan Sims RN (oncoming nurse) by self Aurelio Balling nurse). Report included the following information SBAR, Kardex, MAR and Recent Results.

## 2018-08-23 PROBLEM — E78.2 MIXED HYPERLIPIDEMIA: Status: ACTIVE | Noted: 2018-08-23

## 2018-08-23 PROBLEM — Z98.61 S/P PTCA (PERCUTANEOUS TRANSLUMINAL CORONARY ANGIOPLASTY): Status: ACTIVE | Noted: 2018-08-23

## 2019-01-30 RX ORDER — CEFAZOLIN SODIUM 1 G/3ML
2-3 INJECTION, POWDER, FOR SOLUTION INTRAMUSCULAR; INTRAVENOUS
Status: CANCELLED | OUTPATIENT
Start: 2019-01-31 | End: 2019-01-31

## 2019-02-14 ENCOUNTER — HOSPITAL ENCOUNTER (OUTPATIENT)
Dept: SURGERY | Age: 59
Discharge: HOME | End: 2019-02-14

## 2019-02-19 VITALS — HEIGHT: 67 IN | BODY MASS INDEX: 23.54 KG/M2 | WEIGHT: 150 LBS

## 2019-02-19 NOTE — PERIOP NOTES
Patient verified name and . Order for consent found in EHR and matches case posting; patient verifies procedure. Type 2 surgery, phone assessment complete. Orders received. Labs per surgeon: none  Labs per anesthesia protocol: no hgb needed per Dr Maycol Nava' anesthesia protocol    Pt is followed by Dr Deanna Lopez [facility] cardiology) for hx of CAD. Last office note 18, EKG 18, ECHO 18, heart cath 18 in EMR for anesthesia reference. Telephone encounter 19 states \"He is low to moderate risk for surgery. He may hold Brilinta 5 days prior to surgery, but needs to resume it post op. \"    Surgeon's office note 18 states hold Brilinta x 5 days prior to surgery. Pt states he did not receive any message from surgeon's office with instructions re medication hold so he has not stopped his Brilinta- last dose was 2030 (evening) yesterday. Pt instructed to not take his Brilinta today, tomorrow or DOS (19) but remain on ASA 81mg. Called surgeon's office and l/m with surgeon's nurse Silvia Alvarez) informing her of pt not being off Brilinta x 5 days prior to surgery so that surgeon is aware. Patient answered medical/surgical history questions at their best of ability. All prior to admission medications documented in [facility]. Patient instructed to take the following medications the day of surgery according to anesthesia guidelines with a small sip of water: asa 81mg, lipitor, coreg, zantac. Hold all vitamins 7 days prior to surgery and NSAIDS 5 days prior to surgery. Prescription meds to hold: lisinopril DOS, metformin DOS. Brilinta start holding today.     Patient instructed on the following:  Arrive at A Entrance, time of arrival to be called the day before by 1700  NPO after midnight including gum, mints, and ice chips  Responsible adult must drive patient to the hospital, stay during surgery, and patient will need supervision 24 hours after anesthesia  Use antibacterial soap in shower the night before surgery and on the morning of surgery  All piercings must be removed prior to arrival.    Leave all valuables (money and jewelry) at home but bring insurance card and ID on  DOS. Do not wear make-up, nail polish, lotions, cologne, perfumes, powders, or oil on skin. Patient teach back successful and patient demonstrates knowledge of instruction.

## 2019-02-21 ENCOUNTER — ANESTHESIA (OUTPATIENT)
Dept: SURGERY | Age: 59
End: 2019-02-21
Payer: COMMERCIAL

## 2019-02-21 ENCOUNTER — HOSPITAL ENCOUNTER (OUTPATIENT)
Age: 59
Setting detail: OUTPATIENT SURGERY
Discharge: HOME | End: 2019-02-21
Attending: SURGERY | Admitting: SURGERY
Payer: COMMERCIAL

## 2019-02-21 ENCOUNTER — ANESTHESIA EVENT (OUTPATIENT)
Dept: SURGERY | Age: 59
End: 2019-02-21
Payer: COMMERCIAL

## 2019-02-21 VITALS
SYSTOLIC BLOOD PRESSURE: 138 MMHG | OXYGEN SATURATION: 97 % | WEIGHT: 150 LBS | BODY MASS INDEX: 23.54 KG/M2 | DIASTOLIC BLOOD PRESSURE: 63 MMHG | HEIGHT: 67 IN | HEART RATE: 75 BPM | RESPIRATION RATE: 16 BRPM | TEMPERATURE: 96.9 F

## 2019-02-21 DIAGNOSIS — K40.21 BILATERAL RECURRENT INGUINAL HERNIA WITHOUT OBSTRUCTION OR GANGRENE: Primary | ICD-10-CM

## 2019-02-21 LAB — GLUCOSE BLD STRIP.AUTO-MCNC: 102 MG/DL (ref 65–100)

## 2019-02-21 PROCEDURE — 74011250636 HC RX REV CODE- 250/636: Performed by: ANESTHESIOLOGY

## 2019-02-21 PROCEDURE — 77030025927 HC STPLR FIX SECURSTRP J&J -F: Performed by: SURGERY

## 2019-02-21 PROCEDURE — 77030031139 HC SUT VCRL2 J&J -A: Performed by: SURGERY

## 2019-02-21 PROCEDURE — 74011250636 HC RX REV CODE- 250/636: Performed by: SURGERY

## 2019-02-21 PROCEDURE — 76210000006 HC OR PH I REC 0.5 TO 1 HR: Performed by: SURGERY

## 2019-02-21 PROCEDURE — 74011250637 HC RX REV CODE- 250/637: Performed by: ANESTHESIOLOGY

## 2019-02-21 PROCEDURE — 74011250636 HC RX REV CODE- 250/636

## 2019-02-21 PROCEDURE — 74011000250 HC RX REV CODE- 250: Performed by: SURGERY

## 2019-02-21 PROCEDURE — 77030039425 HC BLD LARYNG TRULITE DISP TELE -A: Performed by: ANESTHESIOLOGY

## 2019-02-21 PROCEDURE — 77030032490 HC SLV COMPR SCD KNE COVD -B: Performed by: SURGERY

## 2019-02-21 PROCEDURE — 77030027502 HC TRCR ENDOSC BLNT COVD -B: Performed by: SURGERY

## 2019-02-21 PROCEDURE — 76210000020 HC REC RM PH II FIRST 0.5 HR: Performed by: SURGERY

## 2019-02-21 PROCEDURE — 77030035048 HC TRCR ENDOSC OPTCL COVD -B: Performed by: SURGERY

## 2019-02-21 PROCEDURE — 77030020782 HC GWN BAIR PAWS FLX 3M -B: Performed by: ANESTHESIOLOGY

## 2019-02-21 PROCEDURE — 77030019908 HC STETH ESOPH SIMS -A: Performed by: ANESTHESIOLOGY

## 2019-02-21 PROCEDURE — 77030008522 HC TBNG INSUF LAPRO STRY -B: Performed by: SURGERY

## 2019-02-21 PROCEDURE — 77030037088 HC TUBE ENDOTRACH ORAL NSL COVD-A: Performed by: ANESTHESIOLOGY

## 2019-02-21 PROCEDURE — 76060000033 HC ANESTHESIA 1 TO 1.5 HR: Performed by: SURGERY

## 2019-02-21 PROCEDURE — 76010000149 HC OR TIME 1 TO 1.5 HR: Performed by: SURGERY

## 2019-02-21 PROCEDURE — 74011000250 HC RX REV CODE- 250

## 2019-02-21 PROCEDURE — C1727 CATH, BAL TIS DIS, NON-VAS: HCPCS | Performed by: SURGERY

## 2019-02-21 PROCEDURE — 77030034849: Performed by: SURGERY

## 2019-02-21 PROCEDURE — 82962 GLUCOSE BLOOD TEST: CPT

## 2019-02-21 PROCEDURE — C1781 MESH (IMPLANTABLE): HCPCS | Performed by: SURGERY

## 2019-02-21 DEVICE — MESH HERN L W10.8XL16CM L INGUINAL WHT POLYPR MFIL: Type: IMPLANTABLE DEVICE | Site: INGUINAL | Status: FUNCTIONAL

## 2019-02-21 DEVICE — MESH HERN L W10.8XL16CM R INGUINAL WHT POLYPR MFIL: Type: IMPLANTABLE DEVICE | Site: INGUINAL | Status: FUNCTIONAL

## 2019-02-21 RX ORDER — ROCURONIUM BROMIDE 10 MG/ML
INJECTION, SOLUTION INTRAVENOUS AS NEEDED
Status: DISCONTINUED | OUTPATIENT
Start: 2019-02-21 | End: 2019-02-21

## 2019-02-21 RX ORDER — HYDROCODONE BITARTRATE AND ACETAMINOPHEN 5; 325 MG/1; MG/1
TABLET ORAL
Qty: 30 TAB | Refills: 0 | Status: SHIPPED | OUTPATIENT
Start: 2019-02-21 | End: 2019-03-06 | Stop reason: ALTCHOICE

## 2019-02-21 RX ORDER — SODIUM CHLORIDE, SODIUM LACTATE, POTASSIUM CHLORIDE, CALCIUM CHLORIDE 600; 310; 30; 20 MG/100ML; MG/100ML; MG/100ML; MG/100ML
100 INJECTION, SOLUTION INTRAVENOUS CONTINUOUS
Status: DISCONTINUED | OUTPATIENT
Start: 2019-02-21 | End: 2019-02-21 | Stop reason: HOSPADM

## 2019-02-21 RX ORDER — GLYCOPYRROLATE 0.2 MG/ML
INJECTION INTRAMUSCULAR; INTRAVENOUS AS NEEDED
Status: DISCONTINUED | OUTPATIENT
Start: 2019-02-21 | End: 2019-02-21 | Stop reason: HOSPADM

## 2019-02-21 RX ORDER — MIDAZOLAM HYDROCHLORIDE 1 MG/ML
2 INJECTION, SOLUTION INTRAMUSCULAR; INTRAVENOUS
Status: DISCONTINUED | OUTPATIENT
Start: 2019-02-21 | End: 2019-02-21 | Stop reason: HOSPADM

## 2019-02-21 RX ORDER — FENTANYL CITRATE 50 UG/ML
25 INJECTION, SOLUTION INTRAMUSCULAR; INTRAVENOUS ONCE
Status: DISCONTINUED | OUTPATIENT
Start: 2019-02-21 | End: 2019-02-21 | Stop reason: HOSPADM

## 2019-02-21 RX ORDER — BUPIVACAINE HYDROCHLORIDE AND EPINEPHRINE 2.5; 5 MG/ML; UG/ML
INJECTION, SOLUTION EPIDURAL; INFILTRATION; INTRACAUDAL; PERINEURAL AS NEEDED
Status: DISCONTINUED | OUTPATIENT
Start: 2019-02-21 | End: 2019-02-21 | Stop reason: HOSPADM

## 2019-02-21 RX ORDER — ONDANSETRON 2 MG/ML
INJECTION INTRAMUSCULAR; INTRAVENOUS AS NEEDED
Status: DISCONTINUED | OUTPATIENT
Start: 2019-02-21 | End: 2019-02-21 | Stop reason: HOSPADM

## 2019-02-21 RX ORDER — SODIUM CHLORIDE 9 MG/ML
50 INJECTION, SOLUTION INTRAVENOUS CONTINUOUS
Status: CANCELLED | OUTPATIENT
Start: 2019-02-21 | End: 2019-02-22

## 2019-02-21 RX ORDER — MIDAZOLAM HYDROCHLORIDE 1 MG/ML
2 INJECTION, SOLUTION INTRAMUSCULAR; INTRAVENOUS ONCE
Status: DISCONTINUED | OUTPATIENT
Start: 2019-02-21 | End: 2019-02-21 | Stop reason: HOSPADM

## 2019-02-21 RX ORDER — LIDOCAINE HYDROCHLORIDE 10 MG/ML
0.1 INJECTION INFILTRATION; PERINEURAL AS NEEDED
Status: DISCONTINUED | OUTPATIENT
Start: 2019-02-21 | End: 2019-02-21 | Stop reason: HOSPADM

## 2019-02-21 RX ORDER — HYDROMORPHONE HYDROCHLORIDE 2 MG/ML
0.5 INJECTION, SOLUTION INTRAMUSCULAR; INTRAVENOUS; SUBCUTANEOUS
Status: DISCONTINUED | OUTPATIENT
Start: 2019-02-21 | End: 2019-02-21 | Stop reason: HOSPADM

## 2019-02-21 RX ORDER — CEFAZOLIN SODIUM/WATER 2 G/20 ML
2 SYRINGE (ML) INTRAVENOUS ONCE
Status: COMPLETED | OUTPATIENT
Start: 2019-02-21 | End: 2019-02-21

## 2019-02-21 RX ORDER — OXYCODONE AND ACETAMINOPHEN 5; 325 MG/1; MG/1
1 TABLET ORAL AS NEEDED
Status: DISCONTINUED | OUTPATIENT
Start: 2019-02-21 | End: 2019-02-21 | Stop reason: HOSPADM

## 2019-02-21 RX ORDER — SODIUM CHLORIDE 0.9 % (FLUSH) 0.9 %
5-40 SYRINGE (ML) INJECTION AS NEEDED
Status: DISCONTINUED | OUTPATIENT
Start: 2019-02-21 | End: 2019-02-21 | Stop reason: HOSPADM

## 2019-02-21 RX ORDER — NEOSTIGMINE METHYLSULFATE 1 MG/ML
INJECTION INTRAVENOUS AS NEEDED
Status: DISCONTINUED | OUTPATIENT
Start: 2019-02-21 | End: 2019-02-21 | Stop reason: HOSPADM

## 2019-02-21 RX ORDER — SODIUM CHLORIDE 0.9 % (FLUSH) 0.9 %
5-40 SYRINGE (ML) INJECTION EVERY 8 HOURS
Status: DISCONTINUED | OUTPATIENT
Start: 2019-02-21 | End: 2019-02-21 | Stop reason: HOSPADM

## 2019-02-21 RX ORDER — OXYCODONE HYDROCHLORIDE 5 MG/1
5 TABLET ORAL
Status: DISCONTINUED | OUTPATIENT
Start: 2019-02-21 | End: 2019-02-21 | Stop reason: HOSPADM

## 2019-02-21 RX ORDER — DIPHENHYDRAMINE HYDROCHLORIDE 50 MG/ML
12.5 INJECTION, SOLUTION INTRAMUSCULAR; INTRAVENOUS ONCE
Status: DISCONTINUED | OUTPATIENT
Start: 2019-02-21 | End: 2019-02-21 | Stop reason: HOSPADM

## 2019-02-21 RX ORDER — KETOROLAC TROMETHAMINE 30 MG/ML
INJECTION, SOLUTION INTRAMUSCULAR; INTRAVENOUS AS NEEDED
Status: DISCONTINUED | OUTPATIENT
Start: 2019-02-21 | End: 2019-02-21 | Stop reason: HOSPADM

## 2019-02-21 RX ORDER — FENTANYL CITRATE 50 UG/ML
INJECTION, SOLUTION INTRAMUSCULAR; INTRAVENOUS AS NEEDED
Status: DISCONTINUED | OUTPATIENT
Start: 2019-02-21 | End: 2019-02-21 | Stop reason: HOSPADM

## 2019-02-21 RX ORDER — DEXAMETHASONE SODIUM PHOSPHATE 4 MG/ML
INJECTION, SOLUTION INTRA-ARTICULAR; INTRALESIONAL; INTRAMUSCULAR; INTRAVENOUS; SOFT TISSUE AS NEEDED
Status: DISCONTINUED | OUTPATIENT
Start: 2019-02-21 | End: 2019-02-21 | Stop reason: HOSPADM

## 2019-02-21 RX ORDER — PROPOFOL 10 MG/ML
INJECTION, EMULSION INTRAVENOUS AS NEEDED
Status: DISCONTINUED | OUTPATIENT
Start: 2019-02-21 | End: 2019-02-21 | Stop reason: HOSPADM

## 2019-02-21 RX ORDER — LIDOCAINE HYDROCHLORIDE 20 MG/ML
INJECTION, SOLUTION EPIDURAL; INFILTRATION; INTRACAUDAL; PERINEURAL AS NEEDED
Status: DISCONTINUED | OUTPATIENT
Start: 2019-02-21 | End: 2019-02-21 | Stop reason: HOSPADM

## 2019-02-21 RX ORDER — ROCURONIUM BROMIDE 10 MG/ML
INJECTION, SOLUTION INTRAVENOUS AS NEEDED
Status: DISCONTINUED | OUTPATIENT
Start: 2019-02-21 | End: 2019-02-21 | Stop reason: HOSPADM

## 2019-02-21 RX ORDER — FAMOTIDINE 20 MG/1
20 TABLET, FILM COATED ORAL ONCE
Status: COMPLETED | OUTPATIENT
Start: 2019-02-21 | End: 2019-02-21

## 2019-02-21 RX ADMIN — ROCURONIUM BROMIDE 40 MG: 10 INJECTION, SOLUTION INTRAVENOUS at 15:01

## 2019-02-21 RX ADMIN — GLYCOPYRROLATE 0.4 MG: 0.2 INJECTION INTRAMUSCULAR; INTRAVENOUS at 16:08

## 2019-02-21 RX ADMIN — FENTANYL CITRATE 100 MCG: 50 INJECTION, SOLUTION INTRAMUSCULAR; INTRAVENOUS at 14:45

## 2019-02-21 RX ADMIN — ONDANSETRON 4 MG: 2 INJECTION INTRAMUSCULAR; INTRAVENOUS at 15:15

## 2019-02-21 RX ADMIN — NEOSTIGMINE METHYLSULFATE 3 MG: 1 INJECTION INTRAVENOUS at 16:08

## 2019-02-21 RX ADMIN — LIDOCAINE HYDROCHLORIDE 100 MG: 20 INJECTION, SOLUTION EPIDURAL; INFILTRATION; INTRACAUDAL; PERINEURAL at 15:01

## 2019-02-21 RX ADMIN — DEXAMETHASONE SODIUM PHOSPHATE 10 MG: 4 INJECTION, SOLUTION INTRA-ARTICULAR; INTRALESIONAL; INTRAMUSCULAR; INTRAVENOUS; SOFT TISSUE at 15:15

## 2019-02-21 RX ADMIN — SODIUM CHLORIDE, SODIUM LACTATE, POTASSIUM CHLORIDE, AND CALCIUM CHLORIDE 100 ML/HR: 600; 310; 30; 20 INJECTION, SOLUTION INTRAVENOUS at 13:11

## 2019-02-21 RX ADMIN — LIDOCAINE HYDROCHLORIDE 0.1 ML: 10 INJECTION, SOLUTION INFILTRATION; PERINEURAL at 13:11

## 2019-02-21 RX ADMIN — SODIUM CHLORIDE, SODIUM LACTATE, POTASSIUM CHLORIDE, AND CALCIUM CHLORIDE: 600; 310; 30; 20 INJECTION, SOLUTION INTRAVENOUS at 16:02

## 2019-02-21 RX ADMIN — Medication 2 G: at 14:58

## 2019-02-21 RX ADMIN — FAMOTIDINE 20 MG: 20 TABLET, FILM COATED ORAL at 13:00

## 2019-02-21 RX ADMIN — PROPOFOL 200 MG: 10 INJECTION, EMULSION INTRAVENOUS at 15:01

## 2019-02-21 RX ADMIN — KETOROLAC TROMETHAMINE 30 MG: 30 INJECTION, SOLUTION INTRAMUSCULAR; INTRAVENOUS at 16:09

## 2019-02-21 NOTE — ANESTHESIA POSTPROCEDURE EVALUATION
Procedure(s): HERNIA INGUINAL REPAIR BILATERAL LAPAROSCOPIC WITH MESH/ REMOVAL OF BILATERAL CORD LIPOMAS. Anesthesia Post Evaluation Patient location during evaluation: PACU Patient participation: complete - patient participated Level of consciousness: awake Pain management: adequate Airway patency: patent Anesthetic complications: no 
Cardiovascular status: acceptable Respiratory status: spontaneous ventilation and acceptable Hydration status: acceptable Visit Vitals /63 Pulse 75 Temp 36.1 °C (96.9 °F) Resp 16 Ht 5' 7\" (1.702 m) Wt 68 kg (150 lb) SpO2 97% BMI 23.49 kg/m²

## 2019-02-21 NOTE — ANESTHESIA PREPROCEDURE EVALUATION
Anesthetic History Review of Systems / Medical History Patient summary reviewed Pulmonary Smoker (Former) Neuro/Psych Cardiovascular Hypertension Past MI (8/2018), CAD, cardiac stents (8/2018) and hyperlipidemia Exercise tolerance: >4 METS Comments: Echo 8/2018: 
SUMMARY: 
 
-  Left ventricle: Systolic function was mildly reduced. Ejection fraction  
was 
estimated in the range of 45 % to 50 %. There was hypokinesis of the  
basal-mid 
inferior wall(s). -  Inferior vena cava, hepatic veins: The inferior vena cava was mildly 
dilated. The respirophasic change in diameter was more than 50%. -  Tricuspid valve: There was mild regurgitation. GI/Hepatic/Renal 
  
 
 
 
 
 
 Endo/Other Diabetes Other Findings Physical Exam 
 
Airway Mallampati: II 
 
Neck ROM: normal range of motion Mouth opening: Normal 
 
 Cardiovascular Regular rate and rhythm,  S1 and S2 normal,  no murmur, click, rub, or gallop Dental 
No notable dental hx Pulmonary Breath sounds clear to auscultation Abdominal 
 
 
 
 Other Findings Anesthetic Plan ASA: 3 Anesthesia type: general 
 
 
 
 
 
Anesthetic plan and risks discussed with: Patient

## 2019-02-21 NOTE — PROGRESS NOTES
H&P    Note is filed as progress note since [facility] will not currently allow generation of new note under \"H&P\"    Primary/Requesting provider: Mikaela Schumacher MD          Chief Complaint   Patient presents with    New Patient       right inguinal hernia          HISTORY OF PRESENT ILLNESS  Dominique Zamora is a 59 y.o. male. New Patient   Associated symptoms include abdominal pain.      Patient is a 59 y.o. male who presents for evaluation of a possible hernia and discussion of surgery. He reports suspicion of a hernia for about 15 months, with symptoms in the right groin developing recently. He reports \"burning\" and \"discomfort\" which is transient, and is mostly associated with prolonged standing however recently he has noted it with prolonged sitting, as well. Ironically, he moved some furniture yesterday and has no groin pain today but has pain in his umbilicus. His typical discomfort is alleviated by laying down, although the right groin bulge does not seem to completely reduce. He believes he had a hernia repair as an infant, and he thinks it was the right side. He denies nausea, vomiting, alteration of bowel or bladder function.       Pt is s/p C with PCI (stent x 2) 8/2018 and is on Brilinta/ASA following with Dr Sailaja Giordano. Pt anticipates coming off Brilinta in February.     Medications:        Current Outpatient Medications   Medication Sig    cholecalciferol, vitamin D3, (VITAMIN D3) 2,000 unit tab Take  by mouth.  lisinopril (PRINIVIL, ZESTRIL) 10 mg tablet Take 10 mg by mouth daily.  doxylamine succinate (SLEEP AID, DOXYLAMINE,) 25 mg tablet Take 25 mg by mouth nightly as needed for Sleep.  atorvastatin (LIPITOR) 80 mg tablet Take 1 Tab by mouth daily.  carvedilol (COREG) 6.25 mg tablet Take 1 Tab by mouth two (2) times daily (with meals). (Patient taking differently: Take 3.125 mg by mouth two (2) times daily (with meals). )    aspirin 81 mg chewable tablet Take 1 Tab by mouth daily.  ticagrelor (BRILINTA) 90 mg tablet Take 1 Tab by mouth every twelve (12) hours every twelve (12) hours.  nitroglycerin (NITROSTAT) 0.4 mg SL tablet 1 Tab by SubLINGual route every five (5) minutes as needed for Chest Pain. Up to 3 doses.  metFORMIN (GLUMETZA ER) 500 mg TG24 24 hour tablet Take 500 mg by mouth daily.  fish oil-dha-epa 1,200-144-216 mg cap Take  by mouth.  GARLIC PO Take 1 Cap by mouth daily.  saw palmetto fruit 450 mg cap Take  by mouth two (2) times a day.  fluocinoNIDE (LIDEX) 0.05 % ointment Apply  to affected area as needed.  LRJP-HZQI-JUI-MNA-BWJ-XQKM-HOR PO Take 1 Tab by mouth two (2) times a day.  multivitamin (ONE A DAY) tablet Take 1 Tab by mouth daily.  Omega-3 Fatty Acids 60- mg cpDR Take 1 Cap by mouth two (2) times a day.  coenzyme Q-10 (CO Q-10) 200 mg capsule Take 200 mg by mouth daily.  raNITIdine (ZANTAC) 150 mg tablet Take 150 mg by mouth two (2) times a day.  diclofenac sodium (DICLO GEL) 1 % kit by Apply Externally route.      No current facility-administered medications for this visit.          Allergies:         Allergies   Allergen Reactions    Codeine Itching         Past History:       Past Medical History:   Diagnosis Date    CAD (coronary artery disease)      Diabetes ([address]       prediabetic    GERD (gastroesophageal reflux disease)      Hypercholesterolemia      Hypertension              Past Surgical History:   Procedure Laterality Date    CARDIAC SURG PROCEDURE UNLIST   08/2018     2 stents- placement    HX HERNIA REPAIR         as a infant         Family and Social History:        Family History   Problem Relation Age of Onset    COPD Mother      COPD Father      Heart Disease Father        Social History            Socioeconomic History    Marital status:        Spouse name: Not on file    Number of children: Not on file    Years of education: Not on file    Highest education level: Not on file   Social Needs    Financial resource strain: Not on file    Food insecurity - worry: Not on file    Food insecurity - inability: Not on file    Transportation needs - medical: Not on file   [employer] needs - non-medical: Not on file   Occupational History    Not on file   Tobacco Use    Smoking status: Former Smoker       Last attempt to quit: 2007       Years since quittin.9    Smokeless tobacco: Never Used   Substance and Sexual Activity    Alcohol use: Yes       Comment: occas    Drug use: No    Sexual activity: Not on file   Other Topics Concern    Not on file   Social History Narrative    Not on file       Review of Systems   Constitutional: Negative. HENT: Negative. Eyes:        Wears glasses. Respiratory: Positive for cough. Cough from lisinopril. Cardiovascular:        CAD, HTN   Gastrointestinal: Positive for abdominal pain and heartburn. Genitourinary: Negative. Musculoskeletal: Negative. Skin:        Right inguinal bulge x 15 months   Neurological: Negative. Endo/Heme/Allergies: Bruises/bleeds easily. Brillinta, aspirin   Psychiatric/Behavioral: Negative.          Physical Exam   Constitutional: He appears well-developed and well-nourished. He is cooperative. Non-toxic appearance. HENT:   Head: Normocephalic and atraumatic. Mouth/Throat: Oropharynx is clear and moist.   Eyes: Conjunctivae and EOM are normal. Pupils are equal, round, and reactive to light. No scleral icterus. Neck: Normal range of motion. No JVD present. No tracheal deviation present. No thyromegaly present. Cardiovascular: Normal rate and regular rhythm. Exam reveals no gallop and no friction rub. No murmur heard. Pulmonary/Chest: Effort normal and breath sounds normal. No respiratory distress. He has no wheezes. He has no rales. Abdominal: Soft. Bowel sounds are normal. He exhibits no distension. There is no tenderness.  There is no rebound and no guarding. A hernia is present. Hernia confirmed positive in the right inguinal area (moderate sized, reducible). Left inguinal: severe generalized inguinal attenuation, probable defect at internal ring level. Genitourinary: Testes normal and penis normal.   Musculoskeletal:   No gross deformities   Neurological: He is alert. No cranial nerve deficit. Skin: Skin is warm. He is not diaphoretic. Psychiatric: He has a normal mood and affect. His behavior is normal. Thought content normal.   Vitals reviewed.        ASSESSMENT and PLAN       Encounter Diagnoses   Name Primary?  Bilateral recurrent inguinal hernia without obstruction or gangrene Yes    Antiplatelet or antithrombotic long-term use        Discussed risk of incarceration/strangulation event in inguinal hernias, at least those identified incidentally which may not apply here, as 1% annual risk at 5 years in recent study randomizing men to surgery or observation. The same study indicated a 63% incidence of elective conversion from observation arm to hernia repair due to symptoms by 10 years.      Bilateral repair is recommended and pt is in complete agreement after discussion.     He will contact office to schedule surgery when given the go-ahead to hold Brilinta x 5 days per his cardiologist; he does not need another preop visit.     Will proceed with laparoscopic bilateral  inguinal hernia repair with mesh. Technical details of the procedure are reviewed. Risks reviewed include risks of anesthesia, bleeding, infection, visceral injury, persistent post-operative pain, and hernia recurrence. All questions are answered.

## 2019-02-21 NOTE — OP NOTES
Operative Report    Patient: Deisy Cisneros MRN: [id number]     YOB: 1960  Age: 62 y.o. Sex: male       Date of Surgery: 2/21/2019     Preoperative Diagnosis: Bilateral inguinal hernia without obstruction or gangrene, recurrence not specified [K40.20]     Postoperative Diagnosis: Bilateral inguinal hernia without obstruction or gangrene, recurrence not specified [K40.20]  BILATERAL CORD LIPOMAS     Procedure:  1. Single Incision (SILS) Laparoscopic inguinal hernia repair with mesh- left    2. Single Incision (SILS) Laparoscopic inguinal hernia repair with mesh- right    3. Bilateral spermatic cord lipoma excision    Findings: bilateral direct defects, R>L. Dense adherence of peritoneum to cord c/w pediatric hernia repair. Large left, small right cord lipomas removed    Anesthesia: General     Complications: none    Indications:  As outlined in History and Physical.   Single incision technique is planned to provide the patient with the benefit of less incisional pain and improved cosmesis due to fewer incisions as well as the potential for lower risk of wound infection. This technique is significantly more intricate than standard laparoscopic techniques and typically increases the complexity of the procedure by 10-20%. Procedure in Detail:   Informed consent was obtained and the patient was brought to the operating room and placed on the table in supine position with adequate padding of all pressure points and compression devices on both lower extremities. After the successful induction of general anesthesia, a Alvarado catheter was inserted and the patients abdomen was prepped and draped sterilely. Under additional local anesthesia a roughly 5mm infraumbilical incision was made and the fascia was exposed. The anterior sheath was exposed and incised to the left of the midline.   The preperitoneal/retrorectus space was then developed bluntly and then the dissecting balloon was inserted and deployed, then removed. The insufflating trocar was placed and CO2 was instilled. Visual exploration revealed no bleeding into the space with good dissection of the space. A 5mm trocar was placed in the lower pole of the same incision. Attention was turned to the right groin. There was an obvious direct defect noted visually which did not have entrapped contents. The leading edge of the peritoneum was identified laterally and bluntly dissected off of the retroperitoneum laterally to medially, elevating it for several centimeters off of the retroperitoneum in a cephalad direction as well. This mobilization continued essentially to the midline. This did not identify an indirect sac but there was very dense adherence along the vas deferens, with mobilization resulting in a tear of the peritoneum. The internal ring was not significantly enlarged. A small but long cord lipoma was mobilized from the vas deferens and testicular vessels and excised. Chadd's ligament was exposed for several centimeters. After confirming hemostasis, a large 3-D Max mesh was brought to the field, inked to aid orientation, and deployed into the preperitoneal space. It was unfurled and positioned properly with the notch over the iliac vessels and after confirming proper orientation it was fixed in place with 3 fires of the SecureStrap into Chadd's ligament and a single fire into the upper edge in its midportion. Attention was then turned to the left side where an identical dissection was undertaken. This did reveal a direct defect and did not reveal an indirect sac; the internal ring was not enlarged. Again, dense adherence of the peritoneum to the vas and cord was encountered, although less than on the right. A larger cord lipoma was mobilized and removed. A large 3-D Max mesh was deployed in an identical fashion as noted above.   The preperitoneal space was then confirmed to be hemostatic and the meshes confirmed to be in good position. Insufflation was released and the trocars were removed. Incisional hemostasis was confirmed then the site was closed with 0-vicryl to reapproximate the fascia then the skin was closed with subcuticular 4-0 Vicryl and Steri-Strips were applied. The patient tolerated the procedure well. There were no immediate apparent complications. The monte catheter was removed and the patient was awakened from anesthesia,  extubated in the operating room, and taken to recovery in satisfactory condition. Estimated Blood Loss: per anesthesia           Specimens: none        Counts: Sponge and needle counts were correct.     Signed By:  Daksha Oh MD     February 21, 2019

## 2019-02-21 NOTE — DISCHARGE INSTRUCTIONS
Cristy Moralez M.D.  [phone]    Instructions following Hernia Repair:    ACTIVITY:   Try to take a few short walks with help around the house later today. It is very important to take short walks to avoid blood clots and pneumonia.  You may be light-headed or sleepy from anesthesia, so be careful going up and down stairs. Avoid any activity that involves lifting/pushing more than 30 pounds until your followup appointment  DIET:   Drink only clear, non-carbonated liquids when you first get home (sugar-free if you are diabetic), such as Gatorade, chicken broth, etc.   Later today you may a more normal diet, depending on how you feel    PAIN:   You will be given a prescription for pain medication.  Try to take the pain medication with food, even a few crackers.  You may also use Tylenol, Motrin, Advil, or Aleve instead of the prescription pain medication. Do no take Tylenol and the prescription pain medication within 6 hours of each other.  URINARY RETENTION: If you are unable to empty your bladder within 6 hours after returning home, please go to your nearest Emergency Department or Urgent Care for urinary catheterization. WOUND CARE:   You may shower the day after surgery, unless instructed otherwise.  It is not uncommon for the incisions to ooze or drain blood-tinged fluid. You may cover your incision with gauze or Band-Aids as needed.  Incisions will sometimes develop redness around them, up to an inch or more around, as well as a hard lumpy feel. If this redness continues to get larger, please call the office. FOLLOW UP:   Your follow-up appointment is usually made when your surgery is arranged. Please call the office if you are not sure of this appointment. CALL THE DOCTOR IF:   You have a temperature higher than 101.5° Fahrenheit for more than 6 hours.  You have severe nausea or vomiting.  You develop increasing redness or infection at the incision.      Continue home medications as previously prescribed. RESUME BRILINTA Monday 2/25  .

## 2021-05-18 PROBLEM — I20.0 UNSTABLE ANGINA (HCC): Status: RESOLVED | Noted: 2018-08-07 | Resolved: 2021-05-18

## 2021-05-18 PROBLEM — I25.10 CORONARY ARTERY DISEASE INVOLVING NATIVE CORONARY ARTERY OF NATIVE HEART WITHOUT ANGINA PECTORIS: Status: ACTIVE | Noted: 2021-05-18

## 2021-05-18 PROBLEM — R94.31 ABNORMAL EKG: Status: RESOLVED | Noted: 2018-08-07 | Resolved: 2021-05-18

## 2021-05-18 PROBLEM — R07.9 CHEST PAIN: Status: RESOLVED | Noted: 2018-08-07 | Resolved: 2021-05-18

## 2021-05-18 PROBLEM — I21.4 NSTEMI (NON-ST ELEVATED MYOCARDIAL INFARCTION) (HCC): Status: RESOLVED | Noted: 2018-08-08 | Resolved: 2021-05-18

## 2021-10-27 NOTE — PROGRESS NOTES
Verbal bedside report received from Viviana Jeannette, [facility]. Assumed care of patient. declines

## 2022-03-19 PROBLEM — E11.9 TYPE 2 DIABETES MELLITUS WITHOUT COMPLICATION, WITHOUT LONG-TERM CURRENT USE OF INSULIN (HCC): Status: ACTIVE | Noted: 2018-08-07

## 2022-03-19 PROBLEM — E78.2 MIXED HYPERLIPIDEMIA: Status: ACTIVE | Noted: 2018-08-23

## 2022-03-20 PROBLEM — I25.10 CORONARY ARTERY DISEASE INVOLVING NATIVE CORONARY ARTERY OF NATIVE HEART WITHOUT ANGINA PECTORIS: Status: ACTIVE | Noted: 2021-05-18

## 2022-03-20 PROBLEM — Z98.61 S/P PTCA (PERCUTANEOUS TRANSLUMINAL CORONARY ANGIOPLASTY): Status: ACTIVE | Noted: 2018-08-23

## 2022-03-20 PROBLEM — I10 ESSENTIAL HYPERTENSION WITH GOAL BLOOD PRESSURE LESS THAN 130/85: Status: ACTIVE | Noted: 2018-08-07

## 2022-06-08 ENCOUNTER — OFFICE VISIT (OUTPATIENT)
Dept: CARDIOLOGY CLINIC | Age: 62
End: 2022-06-08
Payer: COMMERCIAL

## 2022-06-08 VITALS
SYSTOLIC BLOOD PRESSURE: 134 MMHG | DIASTOLIC BLOOD PRESSURE: 78 MMHG | HEIGHT: 67 IN | HEART RATE: 72 BPM | WEIGHT: 154.7 LBS | BODY MASS INDEX: 24.28 KG/M2

## 2022-06-08 DIAGNOSIS — I25.10 CORONARY ARTERY DISEASE INVOLVING NATIVE CORONARY ARTERY OF NATIVE HEART WITHOUT ANGINA PECTORIS: ICD-10-CM

## 2022-06-08 DIAGNOSIS — I10 ESSENTIAL HYPERTENSION WITH GOAL BLOOD PRESSURE LESS THAN 130/85: Primary | ICD-10-CM

## 2022-06-08 PROCEDURE — 99214 OFFICE O/P EST MOD 30 MIN: CPT | Performed by: INTERNAL MEDICINE

## 2022-06-08 RX ORDER — KETOCONAZOLE 20 MG/ML
SHAMPOO TOPICAL
COMMUNITY

## 2022-06-08 RX ORDER — MAGNESIUM 200 MG
200 TABLET ORAL DAILY
COMMUNITY

## 2022-06-08 RX ORDER — SILDENAFIL CITRATE 20 MG/1
TABLET ORAL
COMMUNITY

## 2022-06-08 ASSESSMENT — ENCOUNTER SYMPTOMS
GASTROINTESTINAL NEGATIVE: 1
BACK PAIN: 0
EYE PAIN: 0
CHEST TIGHTNESS: 0
SHORTNESS OF BREATH: 0
RESPIRATORY NEGATIVE: 1
ABDOMINAL PAIN: 0
EYES NEGATIVE: 1
ALLERGIC/IMMUNOLOGIC NEGATIVE: 1
PHOTOPHOBIA: 0

## 2022-06-08 NOTE — PROGRESS NOTES
[facility] CARDIOLOGY  [address], [address]  PHONE: [phone]      22    NAME:  Pam Crouch  : 1960  MRN: [id number]         SUBJECTIVE:   Pam Crouch is a 57 y.o. male seen for follow up of:      Chief Complaint   Patient presents with    Coronary Artery Disease     6 mos fu    Hypertension     fu        Cardiac Hx (Reviewed and summarized by me):   1) CAD               18 - NSTEMI pci to RCA (Green) 3.0 x 38 [place], 3.5 x 18 Kashif    2) Lipids              20 - HDL 56, LDL 52, Trig 119              11/15/21 - HDL 47, LDL 52, Trig 252   22 - HDL 44, LDL 43, Trig 97    HPI:  Doing well, denies CP. Continues to remain active. Lipids are well controlled. Past Medical History, Past Surgical History, Family history, Social History, and Medications were all reviewed with the patient today and updated as necessary. Current Outpatient Medications:     ketoconazole (NIZORAL) 2 % shampoo, ketoconazole 2 % shampoo, Disp: , Rfl:     magnesium 200 MG TABS tablet, Take 200 mg by mouth daily, Disp: , Rfl:     sildenafil (REVATIO) 20 MG tablet, sildenafil (pulmonary hypertension) 20 mg tablet  Take 5 tablets every day by oral route.   Do not use within 24 hours of nitrate products, Disp: , Rfl:     GARLIC PO, Take 1 capsule by mouth daily, Disp: , Rfl:     aspirin 81 MG chewable tablet, Take 81 mg by mouth daily, Disp: , Rfl:     atorvastatin (LIPITOR) 80 MG tablet, TAKE 1 TABLET BY MOUTH DAILY, Disp: , Rfl:     carvedilol (COREG) 3.125 MG tablet, TAKE 1 TABLET BY MOUTH TWICE DAILY WITH MEALS, Disp: , Rfl:     vitamin D3 (CHOLECALCIFEROL) 125 MCG (5000 UT) TABS tablet, Take by mouth daily, Disp: , Rfl:     coenzyme Q10 200 MG CAPS capsule, Take 200 mg by mouth daily, Disp: , Rfl:     diclofenac sodium (VOLTAREN) 1 % GEL, Apply topically, Disp: , Rfl:     famotidine (PEPCID) 20 MG tablet, Take 20 mg by mouth 2 times daily, Disp: , Rfl:   fluocinonide (LIDEX) 0.05 % ointment, Apply topically as needed, Disp: , Rfl:     Icosapent Ethyl (VASCEPA) 1 g CAPS capsule, Take 2 capsules by mouth 2 times daily (with meals), Disp: , Rfl:     lisinopril (PRINIVIL;ZESTRIL) 10 MG tablet, Take 10 mg by mouth daily, Disp: , Rfl:     metFORMIN, MOD, (GLUMETZA) 500 MG extended release tablet, Take 500 mg by mouth daily, Disp: , Rfl:     nitroGLYCERIN (NITROSTAT) 0.4 MG SL tablet, Place 0.4 mg under the tongue, Disp: , Rfl:   Allergies   Allergen Reactions    Codeine Itching     Past Medical History:   Diagnosis Date    CAD (coronary artery disease) 08/2018    OSORIO x 2 to occluded RCA; [place] [facility]    Diabetes ([facility]     \"prediabetic\"; oral med, does not check BG at home, last hgba1c- 6.3    GERD (gastroesophageal reflux disease)     managed with medication    Hypercholesterolemia     Hypertension     NSTEMI (non-ST elevated myocardial infarction) ([facility] [address] 08/2018     Past Surgical History:   Procedure Laterality Date    COLONOSCOPY      CORONARY ANGIOPLASTY WITH STENT PLACEMENT  08/2018    2 stents- placement    HERNIA REPAIR Bilateral 02/21/2019    inguinal hernia    HERNIA REPAIR  as an infant     Family History   Problem Relation Age of Onset    COPD Mother     COPD Father     Heart Disease Father      Social History     Tobacco Use    Smoking status: Former Smoker     Quit date: 1/1/2007     Years since quitting: 15.4    Smokeless tobacco: Never Used   Substance Use Topics    Alcohol use: Yes       ROS:  Review of Systems   Constitutional: Negative. Negative for fever. HENT: Negative for hearing loss, nosebleeds and tinnitus. Eyes: Negative. Negative for photophobia and pain. Respiratory: Negative. Negative for chest tightness and shortness of breath. Cardiovascular: Negative. Negative for chest pain, palpitations and leg swelling. Gastrointestinal: Negative. Negative for abdominal pain. Endocrine: Negative. Negative for cold intolerance and heat intolerance. Genitourinary: Negative. Negative for dysuria. Musculoskeletal: Negative. Negative for back pain and joint swelling. Skin: Negative. Negative for rash. Allergic/Immunologic: Negative. Negative for immunocompromised state. Neurological: Negative. Negative for dizziness, syncope and light-headedness. Hematological: Negative. Does not bruise/bleed easily. Psychiatric/Behavioral: Negative. Negative for suicidal ideas. PHYSICAL EXAM:  Physical Exam  Constitutional:       General: He is not in acute distress. Appearance: He is not ill-appearing. HENT:      Head: Normocephalic and atraumatic. Nose: No congestion. Mouth/Throat:      Mouth: Mucous membranes are moist.   Eyes:      Extraocular Movements: Extraocular movements intact. Pupils: Pupils are equal, round, and reactive to light. Cardiovascular:      Rate and Rhythm: Normal rate and regular rhythm. Heart sounds: No murmur heard. No friction rub. No gallop. Pulmonary:      Effort: No respiratory distress. Breath sounds: No wheezing or rhonchi. Musculoskeletal:         General: No swelling. Cervical back: Normal range of motion. Right lower leg: No edema. Left lower leg: No edema. Skin:     General: Skin is warm and dry. Findings: No rash. Neurological:      General: No focal deficit present. Mental Status: He is oriented to person, place, and time. Psychiatric:         Mood and Affect: Mood normal.         Behavior: Behavior normal.         Judgment: Judgment normal.          /78   Pulse 72   Ht 5' 7\" (1.702 m)   Wt 154 lb 11.2 oz (70.2 kg)   BMI 24.23 kg/m²      Wt Readings from Last 10 Encounters:   06/08/22 154 lb 11.2 oz (70.2 kg)   11/16/21 153 lb 11.2 oz (69.7 kg)   05/18/21 155 lb (70.3 kg)           Medical problems and test results were reviewed with the patient today. No results found for: BUN, BUNPOC, IBUN  No results found for: ELAINA, CREAPOC, CREA  No results found for: K, PLK, WBK, KPOCT    No results found for: CHOL, CHOLPOCT, CHOLX, CHLST, CHOLV, HDL, HDLPOC, HDLC, LDL, LDLC, VLDLC, VLDL, TGLX, TRIGL    ASSESSMENT and PLAN  No diagnosis found. IMPRESSION:  1) CAD - continue asa 81 mg daily/statin   2) Lipids well controlled -brings in a health screening report from work. Continue atorvastatin 80 mg daily   3) HTN -carvedilol 3.25 mg twice daily lisinopril 10 mg daily      ALL ORDERS THIS ENCOUNTER  No orders of the defined types were placed in this encounter. No follow-up provider specified. Thank you for allowing me to participate in this patient's care. Please call or contact me if there are any questions or concerns regarding the above.       Bee Ozuna MD  06/08/22  5:00 PM

## 2022-09-14 RX ORDER — CARVEDILOL 3.12 MG/1
TABLET ORAL
Qty: 180 TABLET | Refills: 3 | Status: SHIPPED | OUTPATIENT
Start: 2022-09-14

## 2022-09-14 NOTE — TELEPHONE ENCOUNTER
Requested Prescriptions     Pending Prescriptions Disp Refills    carvedilol (COREG) 3.125 MG tablet [Pharmacy Med Name: CARVEDILOL 3.125MG TABLETS] 180 tablet 3     Sig: TAKE 1 TABLET BY MOUTH TWICE DAILY WITH MEALS

## 2022-12-12 ENCOUNTER — OFFICE VISIT (OUTPATIENT)
Dept: CARDIOLOGY CLINIC | Age: 62
End: 2022-12-12
Payer: COMMERCIAL

## 2022-12-12 VITALS
BODY MASS INDEX: 25.1 KG/M2 | WEIGHT: 159.9 LBS | SYSTOLIC BLOOD PRESSURE: 118 MMHG | HEART RATE: 74 BPM | DIASTOLIC BLOOD PRESSURE: 88 MMHG | HEIGHT: 67 IN

## 2022-12-12 DIAGNOSIS — I25.10 CORONARY ARTERY DISEASE INVOLVING NATIVE CORONARY ARTERY OF NATIVE HEART WITHOUT ANGINA PECTORIS: Primary | ICD-10-CM

## 2022-12-12 PROCEDURE — 93000 ELECTROCARDIOGRAM COMPLETE: CPT | Performed by: INTERNAL MEDICINE

## 2022-12-12 PROCEDURE — 99214 OFFICE O/P EST MOD 30 MIN: CPT | Performed by: INTERNAL MEDICINE

## 2022-12-12 PROCEDURE — 3074F SYST BP LT 130 MM HG: CPT | Performed by: INTERNAL MEDICINE

## 2022-12-12 PROCEDURE — 3078F DIAST BP <80 MM HG: CPT | Performed by: INTERNAL MEDICINE

## 2022-12-12 ASSESSMENT — ENCOUNTER SYMPTOMS
BACK PAIN: 0
CHEST TIGHTNESS: 0
SHORTNESS OF BREATH: 0
RESPIRATORY NEGATIVE: 1
ALLERGIC/IMMUNOLOGIC NEGATIVE: 1
EYES NEGATIVE: 1
GASTROINTESTINAL NEGATIVE: 1
PHOTOPHOBIA: 0
ABDOMINAL PAIN: 0
EYE PAIN: 0

## 2022-12-12 NOTE — PROGRESS NOTES
[facility] CARDIOLOGY  [address], [address]  PHONE: [phone]      22    NAME:  Suzi Naranjo  : 1960  MRN: [id number]         SUBJECTIVE:   Suzi Naranjo is a 57 y.o. male seen for follow up of:      Chief Complaint   Patient presents with    Hypertension        Cardiac Hx (Reviewed and summarized by me):   1) CAD               18 - NSTEMI pci to RCA (Green) 3.0 x 38 [place], 3.5 x 18 [place]if    2) Lipids              20 - HDL 56, LDL 52, Trig 119              11/15/21 - HDL 47, LDL 52, Trig 252              22 - HDL 44, LDL 43, Trig 97   10/31/22 - HDL 49, LDL 45, Trig 98    ECG: NSR normal axis no ischemia (Independent review/interpretation by me)      HPI:  Doing well. Denies chest pain palpitations orthopnea PND lower extremity edema. Remains active. Recently started an exercise program including resistance training. Last lipid panels listed above. Past Medical History, Past Surgical History, Family history, Social History, and Medications were all reviewed with the patient today and updated as necessary. Current Outpatient Medications:     carvedilol (COREG) 3.125 MG tablet, TAKE 1 TABLET BY MOUTH TWICE DAILY WITH MEALS, Disp: 180 tablet, Rfl: 3    ketoconazole (NIZORAL) 2 % shampoo, ketoconazole 2 % shampoo, Disp: , Rfl:     magnesium 200 MG TABS tablet, Take 200 mg by mouth daily, Disp: , Rfl:     sildenafil (REVATIO) 20 MG tablet, sildenafil (pulmonary hypertension) 20 mg tablet  Take 5 tablets every day by oral route.   Do not use within 24 hours of nitrate products, Disp: , Rfl:     GARLIC PO, Take 1 capsule by mouth daily, Disp: , Rfl:     aspirin 81 MG chewable tablet, Take 81 mg by mouth daily, Disp: , Rfl:     atorvastatin (LIPITOR) 80 MG tablet, TAKE 1 TABLET BY MOUTH DAILY, Disp: , Rfl:     vitamin D3 (CHOLECALCIFEROL) 125 MCG (5000 UT) TABS tablet, Take by mouth daily, Disp: , Rfl:     coenzyme Q10 200 MG CAPS capsule, Take 200 mg by mouth daily, Disp: , Rfl:     diclofenac sodium (VOLTAREN) 1 % GEL, Apply topically, Disp: , Rfl:     famotidine (PEPCID) 20 MG tablet, Take 20 mg by mouth 2 times daily, Disp: , Rfl:     fluocinonide (LIDEX) 0.05 % ointment, Apply topically as needed, Disp: , Rfl:     Icosapent Ethyl (VASCEPA) 1 g CAPS capsule, Take 2 capsules by mouth 2 times daily (with meals), Disp: , Rfl:     lisinopril (PRINIVIL;ZESTRIL) 10 MG tablet, Take 10 mg by mouth daily, Disp: , Rfl:     metFORMIN, MOD, (GLUMETZA) 500 MG extended release tablet, Take 500 mg by mouth daily, Disp: , Rfl:     nitroGLYCERIN (NITROSTAT) 0.4 MG SL tablet, Place 0.4 mg under the tongue, Disp: , Rfl:   Allergies   Allergen Reactions    Codeine Itching     Past Medical History:   Diagnosis Date    CAD (coronary artery disease) 08/2018    OSORIO x 2 to occluded RCA; followed by Dr Man Steele ([facility])    Diabetes ([facility]     \"prediabetic\"; oral med, does not check BG at home, last hgba1c- 6.3    GERD (gastroesophageal reflux disease)     managed with medication    Hypercholesterolemia     Hypertension     NSTEMI (non-ST elevated myocardial infarction) ([facility] 08/2018     Past Surgical History:   Procedure Laterality Date    COLONOSCOPY      CORONARY ANGIOPLASTY WITH STENT PLACEMENT  08/2018    2 stents- placement    HERNIA REPAIR Bilateral 02/21/2019    inguinal hernia    HERNIA REPAIR  as an infant     Family History   Problem Relation Age of Onset    COPD Mother     COPD Father     Heart Disease Father      Social History     Tobacco Use    Smoking status: Former     Types: Cigarettes     Quit date: 1/1/2007     Years since quitting: 15.9    Smokeless tobacco: Never   Substance Use Topics    Alcohol use: Yes       ROS:  Review of Systems   Constitutional: Negative. Negative for fever. HENT:  Negative for hearing loss, nosebleeds and tinnitus. Eyes: Negative. Negative for photophobia and pain. Respiratory: Negative.   Negative for chest tightness and shortness of breath. Cardiovascular: Negative. Negative for chest pain, palpitations and leg swelling. Gastrointestinal: Negative. Negative for abdominal pain. Endocrine: Negative. Negative for cold intolerance and heat intolerance. Genitourinary: Negative. Negative for dysuria. Musculoskeletal: Negative. Negative for back pain and joint swelling. Skin: Negative. Negative for rash. Allergic/Immunologic: Negative. Negative for immunocompromised state. Neurological: Negative. Negative for dizziness, syncope and light-headedness. Hematological: Negative. Does not bruise/bleed easily. Psychiatric/Behavioral: Negative. Negative for suicidal ideas. PHYSICAL EXAM:  Physical Exam  Constitutional:       General: He is not in acute distress. Appearance: He is not ill-appearing. HENT:      Head: Normocephalic and atraumatic. Nose: No congestion. Mouth/Throat:      Mouth: Mucous membranes are moist.   Eyes:      Extraocular Movements: Extraocular movements intact. Pupils: Pupils are equal, round, and reactive to light. Cardiovascular:      Rate and Rhythm: Normal rate and regular rhythm. Heart sounds: No murmur heard. No friction rub. No gallop. Pulmonary:      Effort: No respiratory distress. Breath sounds: No wheezing or rhonchi. Musculoskeletal:         General: No swelling. Cervical back: Normal range of motion. Right lower leg: No edema. Left lower leg: No edema. Skin:     General: Skin is warm and dry. Findings: No rash. Neurological:      General: No focal deficit present. Mental Status: He is oriented to person, place, and time.    Psychiatric:         Mood and Affect: Mood normal.         Behavior: Behavior normal.         Judgment: Judgment normal.        /88   Pulse 74   Ht 5' 7\" (1.702 m)   Wt 159 lb 14.4 oz (72.5 kg)   BMI 25.04 kg/m²      Wt Readings from Last 10 Encounters: 12/12/22 159 lb 14.4 oz (72.5 kg)   06/08/22 154 lb 11.2 oz (70.2 kg)   11/16/21 153 lb 11.2 oz (69.7 kg)   05/18/21 155 lb (70.3 kg)           Medical problems and test results were reviewed with the patient today. No results found for: BUN, BUNPOC, IBUN  No results found for: ELAINA, CREAPOC, CREA  No results found for: K, PLK, WBK, KPOCT    No results found for: CHOL, CHOLPOCT, CHOLX, CHLST, CHOLV, HDL, HDLPOC, HDLC, LDL, LDLC, VLDLC, VLDL, TGLX, TRIGL    ASSESSMENT and PLAN    ICD-10-CM    1. Coronary artery disease involving native coronary artery of native heart without angina pectoris  I25.10 EKG 12 lead          IMPRESSION:  1) CAD - continue asa 81 mg daily/statin  2) Lipids well controlled -brings in a health screening report from work. Continue atorvastatin 80 mg daily  3) HTN -carvedilol 3.25 mg twice daily lisinopril 10 mg daily      ALL ORDERS THIS ENCOUNTER  Orders Placed This Encounter    EKG 12 lead     Order Specific Question:   Reason for Exam?     Answer: Other        Follow up in 6 months. Thank you for allowing me to participate in this patient's care. Please call or contact me if there are any questions or concerns regarding the above.       Caren Crawford MD  12/12/22  2:00 PM

## 2023-04-27 ENCOUNTER — OFFICE VISIT (OUTPATIENT)
Dept: SURGERY | Age: 63
End: 2023-04-27
Payer: COMMERCIAL

## 2023-04-27 VITALS
DIASTOLIC BLOOD PRESSURE: 85 MMHG | WEIGHT: 155 LBS | SYSTOLIC BLOOD PRESSURE: 147 MMHG | BODY MASS INDEX: 24.28 KG/M2 | HEART RATE: 62 BPM

## 2023-04-27 DIAGNOSIS — Z98.890 HX OF BILATERAL INGUINAL HERNIA REPAIR: ICD-10-CM

## 2023-04-27 DIAGNOSIS — R10.31 ABDOMINAL WALL PAIN IN RIGHT LOWER QUADRANT: Primary | ICD-10-CM

## 2023-04-27 DIAGNOSIS — Z87.19 HX OF BILATERAL INGUINAL HERNIA REPAIR: ICD-10-CM

## 2023-04-27 PROCEDURE — 1123F ACP DISCUSS/DSCN MKR DOCD: CPT | Performed by: SURGERY

## 2023-04-27 PROCEDURE — 99203 OFFICE O/P NEW LOW 30 MIN: CPT | Performed by: SURGERY

## 2023-04-27 PROCEDURE — 3079F DIAST BP 80-89 MM HG: CPT | Performed by: SURGERY

## 2023-04-27 PROCEDURE — 3077F SYST BP >= 140 MM HG: CPT | Performed by: SURGERY

## 2023-04-27 RX ORDER — TURMERIC 400 MG
CAPSULE ORAL DAILY
COMMUNITY

## 2023-04-27 ASSESSMENT — ENCOUNTER SYMPTOMS
WHEEZING: 0
SINUS PAIN: 0
DIARRHEA: 0
EYE DISCHARGE: 0
ABDOMINAL PAIN: 1
SHORTNESS OF BREATH: 0
SINUS PRESSURE: 0
SORE THROAT: 0
COUGH: 0
NAUSEA: 0
CONSTIPATION: 0
VOMITING: 0
COLOR CHANGE: 0
BACK PAIN: 1
EYE ITCHING: 0
EYE PAIN: 0

## 2023-04-27 NOTE — PROGRESS NOTES
Psychiatric/Behavioral:  Negative for confusion and sleep disturbance. The patient is not nervous/anxious. Physical Exam  Vitals and nursing note reviewed. HENT:      Head: Normocephalic and atraumatic. Eyes:      General: No scleral icterus. Pupils: Pupils are equal, round, and reactive to light. Cardiovascular:      Rate and Rhythm: Normal rate. Pulmonary:      Effort: Pulmonary effort is normal. No respiratory distress. Breath sounds: No stridor. Abdominal:      General: Abdomen is flat. Palpations: Abdomen is soft. There is no mass. Tenderness: There is abdominal tenderness (minimal, near pubic tubercle and along/above inguinal ligament. no inguinal hernia noted). There is no rebound. Hernia: No hernia is present. Musculoskeletal:         General: Tenderness (in region of right SI joint/paraspinous muscles) present. Neurological:      General: No focal deficit present. Mental Status: He is alert and oriented to person, place, and time. Cranial Nerves: No cranial nerve deficit. Psychiatric:         Mood and Affect: Mood normal.         Behavior: Behavior normal.         Thought Content: Thought content normal.         Judgment: Judgment normal.               ASSESSMENT and PLAN:    1. Abdominal wall pain in right lower quadrant    2. Hx of bilateral inguinal hernia repair       No evidence of hernia recurrence. Mesh is readily apparent on CT imaging in proper position  Unremarkable small amount of adipose in right cord distally    Pattern of pain and tenderness of right inguinal ligament and right SI joint suggest simple overuse injuries from vigorous activity, possibly some SI arthritic changes. Recommend continued rest from heavy/vigorous activity. Upon return to activity, attention should be paid to ergonomics and avoiding excessive strain. Questions discussed    Follow-up and Dispositions    Return for as needed.           Follow-up and

## 2023-06-05 RX ORDER — ATORVASTATIN CALCIUM 80 MG/1
TABLET, FILM COATED ORAL
Qty: 90 TABLET | Refills: 3 | Status: SHIPPED | OUTPATIENT
Start: 2023-06-05

## 2023-08-30 RX ORDER — CARVEDILOL 3.12 MG/1
TABLET ORAL
Qty: 180 TABLET | Refills: 1 | Status: SHIPPED | OUTPATIENT
Start: 2023-08-30

## 2023-09-05 ENCOUNTER — OFFICE VISIT (OUTPATIENT)
Age: 63
End: 2023-09-05
Payer: COMMERCIAL

## 2023-09-05 VITALS
SYSTOLIC BLOOD PRESSURE: 130 MMHG | BODY MASS INDEX: 23.93 KG/M2 | WEIGHT: 152.5 LBS | HEART RATE: 66 BPM | HEIGHT: 67 IN | DIASTOLIC BLOOD PRESSURE: 80 MMHG

## 2023-09-05 DIAGNOSIS — I25.10 CORONARY ARTERY DISEASE INVOLVING NATIVE CORONARY ARTERY OF NATIVE HEART WITHOUT ANGINA PECTORIS: Primary | ICD-10-CM

## 2023-09-05 DIAGNOSIS — I10 ESSENTIAL HYPERTENSION WITH GOAL BLOOD PRESSURE LESS THAN 130/85: ICD-10-CM

## 2023-09-05 PROCEDURE — 1123F ACP DISCUSS/DSCN MKR DOCD: CPT | Performed by: INTERNAL MEDICINE

## 2023-09-05 PROCEDURE — 99214 OFFICE O/P EST MOD 30 MIN: CPT | Performed by: INTERNAL MEDICINE

## 2023-09-05 PROCEDURE — 3079F DIAST BP 80-89 MM HG: CPT | Performed by: INTERNAL MEDICINE

## 2023-09-05 PROCEDURE — 3075F SYST BP GE 130 - 139MM HG: CPT | Performed by: INTERNAL MEDICINE

## 2023-09-05 RX ORDER — IPRATROPIUM BROMIDE 42 UG/1
SPRAY, METERED NASAL
COMMUNITY
Start: 2022-07-21

## 2023-09-05 RX ORDER — MULTIVIT WITH MINERALS/LUTEIN
1000 TABLET ORAL DAILY
COMMUNITY

## 2023-09-05 ASSESSMENT — ENCOUNTER SYMPTOMS
CHEST TIGHTNESS: 0
PHOTOPHOBIA: 0
EYE PAIN: 0
SHORTNESS OF BREATH: 0
ABDOMINAL PAIN: 0
ALLERGIC/IMMUNOLOGIC NEGATIVE: 1
EYES NEGATIVE: 1
RESPIRATORY NEGATIVE: 1
BACK PAIN: 0
GASTROINTESTINAL NEGATIVE: 1

## 2024-02-22 RX ORDER — CARVEDILOL 3.12 MG/1
TABLET ORAL
Qty: 180 TABLET | Refills: 1 | Status: SHIPPED | OUTPATIENT
Start: 2024-02-22

## 2024-02-22 NOTE — TELEPHONE ENCOUNTER
Requested Prescriptions     Signed Prescriptions Disp Refills    carvedilol (COREG) 3.125 MG tablet 180 tablet 1     Sig: TAKE 1 TABLET BY MOUTH TWICE DAILY WITH MEALS     Authorizing Provider: JOIE SHRESTHA     Ordering User: PATRICK RIVERA     Rx verified.

## 2024-04-16 ENCOUNTER — OFFICE VISIT (OUTPATIENT)
Age: 64
End: 2024-04-16
Payer: COMMERCIAL

## 2024-04-16 VITALS
HEIGHT: 67 IN | HEART RATE: 60 BPM | DIASTOLIC BLOOD PRESSURE: 80 MMHG | BODY MASS INDEX: 23.43 KG/M2 | SYSTOLIC BLOOD PRESSURE: 126 MMHG | WEIGHT: 149.3 LBS

## 2024-04-16 DIAGNOSIS — I25.10 CORONARY ARTERY DISEASE INVOLVING NATIVE CORONARY ARTERY OF NATIVE HEART WITHOUT ANGINA PECTORIS: Primary | ICD-10-CM

## 2024-04-16 DIAGNOSIS — I10 ESSENTIAL HYPERTENSION WITH GOAL BLOOD PRESSURE LESS THAN 130/85: ICD-10-CM

## 2024-04-16 DIAGNOSIS — E78.2 MIXED HYPERLIPIDEMIA: ICD-10-CM

## 2024-04-16 DIAGNOSIS — Z98.61 S/P PTCA (PERCUTANEOUS TRANSLUMINAL CORONARY ANGIOPLASTY): ICD-10-CM

## 2024-04-16 PROCEDURE — 3074F SYST BP LT 130 MM HG: CPT | Performed by: INTERNAL MEDICINE

## 2024-04-16 PROCEDURE — 3079F DIAST BP 80-89 MM HG: CPT | Performed by: INTERNAL MEDICINE

## 2024-04-16 PROCEDURE — 1123F ACP DISCUSS/DSCN MKR DOCD: CPT | Performed by: INTERNAL MEDICINE

## 2024-04-16 PROCEDURE — 99214 OFFICE O/P EST MOD 30 MIN: CPT | Performed by: INTERNAL MEDICINE

## 2024-04-16 RX ORDER — AMOXICILLIN 500 MG
CAPSULE ORAL
COMMUNITY
Start: 2024-03-01

## 2024-04-16 RX ORDER — NIACIN 250 MG
500 TABLET ORAL NIGHTLY
COMMUNITY

## 2024-04-16 ASSESSMENT — ENCOUNTER SYMPTOMS
PHOTOPHOBIA: 0
GASTROINTESTINAL NEGATIVE: 1
EYE PAIN: 0
RESPIRATORY NEGATIVE: 1
ABDOMINAL PAIN: 0
SHORTNESS OF BREATH: 0
EYES NEGATIVE: 1
ALLERGIC/IMMUNOLOGIC NEGATIVE: 1
CHEST TIGHTNESS: 0
BACK PAIN: 0

## 2024-04-16 NOTE — PROGRESS NOTES
[facility] CARDIOLOGY  [address] [place] [address], [address]  [place] [address]  PHONE: [phone]      24    NAME:  Edd Esparza  : 1960  MRN: [id number]         SUBJECTIVE:   Edd Esparza is a 64 y.o. male seen for follow up of:      Chief Complaint   Patient presents with    Coronary Artery Disease        Cardiac Hx (Reviewed and summarized by me):  1) CAD  18 - NSTEMI pci to RCA (Green) 3.0 x 38 Kashif, 3.5 x 18 Kashif   2) Lipids              20 - HDL 56, LDL 52, Trig 119              11/15/21 - HDL 47, LDL 52, Trig 252              22 - HDL 44, LDL 43, Trig 97              10/31/22 - HDL 49, LDL 45, Trig 98   23 - HDL 51, LDL 64, Trig 75, Ratio 2.6  3) HTN       EC23 - NSR normal axis no ischemia (auto recent inferior infarct pattern but he does not have pathologic Q waves in the inferior leads.)    HPI:  Doing well, Recently hiked [employer] went hiking and did not have CP.  Has not been exercising regularly however has no cardiac symptoms.  Is compliant with his therapy    Past Medical History, Past Surgical History, Family history, Social History, and Medications were all reviewed with the patient today and updated as necessary.       Current Outpatient Medications:     Potassium 99 MG TABS, Take by mouth, Disp: , Rfl:     niacin 250 MG tablet, Take 2 tablets by mouth nightly, Disp: , Rfl:     CINNAMON PO, Take 1,000 mg by mouth 2 times daily, Disp: , Rfl:     Omega-3 Fatty Acids (FISH OIL) 1200 MG CAPS, , Disp: , Rfl:     carvedilol (COREG) 3.125 MG tablet, TAKE 1 TABLET BY MOUTH TWICE DAILY WITH MEALS, Disp: 180 tablet, Rfl: 1    ipratropium (ATROVENT) 0.06 % nasal spray, , Disp: , Rfl:     Ascorbic Acid (VITAMIN C) 1000 MG tablet, Take 1 tablet by mouth daily, Disp: , Rfl:     atorvastatin (LIPITOR) 80 MG tablet, TAKE 1 TABLET BY MOUTH DAILY, Disp: 90 tablet, Rfl: 3    Turmeric 400 MG CAPS, Take by mouth daily, Disp: , Rfl:

## 2024-05-20 RX ORDER — ATORVASTATIN CALCIUM 80 MG/1
TABLET, FILM COATED ORAL
Qty: 90 TABLET | Refills: 3 | Status: SHIPPED | OUTPATIENT
Start: 2024-05-20

## 2024-08-12 RX ORDER — CARVEDILOL 3.12 MG/1
TABLET ORAL
Qty: 180 TABLET | Refills: 3 | Status: SHIPPED | OUTPATIENT
Start: 2024-08-12

## 2024-10-23 ENCOUNTER — OFFICE VISIT (OUTPATIENT)
Age: 64
End: 2024-10-23
Payer: COMMERCIAL

## 2024-10-23 VITALS
BODY MASS INDEX: 24.17 KG/M2 | DIASTOLIC BLOOD PRESSURE: 90 MMHG | HEIGHT: 67 IN | SYSTOLIC BLOOD PRESSURE: 140 MMHG | HEART RATE: 61 BPM | WEIGHT: 154 LBS

## 2024-10-23 DIAGNOSIS — I25.10 CORONARY ARTERY DISEASE INVOLVING NATIVE CORONARY ARTERY OF NATIVE HEART WITHOUT ANGINA PECTORIS: Primary | ICD-10-CM

## 2024-10-23 DIAGNOSIS — I10 ESSENTIAL HYPERTENSION WITH GOAL BLOOD PRESSURE LESS THAN 130/85: ICD-10-CM

## 2024-10-23 PROCEDURE — 3080F DIAST BP >= 90 MM HG: CPT | Performed by: INTERNAL MEDICINE

## 2024-10-23 PROCEDURE — 1123F ACP DISCUSS/DSCN MKR DOCD: CPT | Performed by: INTERNAL MEDICINE

## 2024-10-23 PROCEDURE — 99214 OFFICE O/P EST MOD 30 MIN: CPT | Performed by: INTERNAL MEDICINE

## 2024-10-23 PROCEDURE — 3077F SYST BP >= 140 MM HG: CPT | Performed by: INTERNAL MEDICINE

## 2024-10-23 PROCEDURE — 93000 ELECTROCARDIOGRAM COMPLETE: CPT | Performed by: INTERNAL MEDICINE

## 2024-10-23 ASSESSMENT — ENCOUNTER SYMPTOMS
CHEST TIGHTNESS: 0
EYE PAIN: 0
EYES NEGATIVE: 1
ABDOMINAL PAIN: 0
RESPIRATORY NEGATIVE: 1
GASTROINTESTINAL NEGATIVE: 1
ALLERGIC/IMMUNOLOGIC NEGATIVE: 1
PHOTOPHOBIA: 0
BACK PAIN: 0
SHORTNESS OF BREATH: 0

## 2025-04-09 ENCOUNTER — OFFICE VISIT (OUTPATIENT)
Age: 65
End: 2025-04-09
Payer: COMMERCIAL

## 2025-04-09 VITALS
HEART RATE: 56 BPM | HEIGHT: 67 IN | SYSTOLIC BLOOD PRESSURE: 138 MMHG | DIASTOLIC BLOOD PRESSURE: 78 MMHG | BODY MASS INDEX: 23.32 KG/M2 | WEIGHT: 148.6 LBS

## 2025-04-09 DIAGNOSIS — E78.2 MIXED HYPERLIPIDEMIA: ICD-10-CM

## 2025-04-09 DIAGNOSIS — I25.10 CORONARY ARTERY DISEASE INVOLVING NATIVE CORONARY ARTERY OF NATIVE HEART WITHOUT ANGINA PECTORIS: Primary | ICD-10-CM

## 2025-04-09 DIAGNOSIS — I10 ESSENTIAL HYPERTENSION WITH GOAL BLOOD PRESSURE LESS THAN 130/85: ICD-10-CM

## 2025-04-09 DIAGNOSIS — Z98.61 S/P PTCA (PERCUTANEOUS TRANSLUMINAL CORONARY ANGIOPLASTY): ICD-10-CM

## 2025-04-09 PROCEDURE — 3075F SYST BP GE 130 - 139MM HG: CPT | Performed by: INTERNAL MEDICINE

## 2025-04-09 PROCEDURE — 99214 OFFICE O/P EST MOD 30 MIN: CPT | Performed by: INTERNAL MEDICINE

## 2025-04-09 PROCEDURE — 1123F ACP DISCUSS/DSCN MKR DOCD: CPT | Performed by: INTERNAL MEDICINE

## 2025-04-09 PROCEDURE — 3078F DIAST BP <80 MM HG: CPT | Performed by: INTERNAL MEDICINE

## 2025-04-09 ASSESSMENT — ENCOUNTER SYMPTOMS
SHORTNESS OF BREATH: 0
BACK PAIN: 0
CHEST TIGHTNESS: 0
PHOTOPHOBIA: 0
GASTROINTESTINAL NEGATIVE: 1
ALLERGIC/IMMUNOLOGIC NEGATIVE: 1
EYES NEGATIVE: 1
RESPIRATORY NEGATIVE: 1
EYE PAIN: 0
ABDOMINAL PAIN: 0

## 2025-04-09 NOTE — PROGRESS NOTES
[facility] CARDIOLOGY  [address] [place] [address], [address]  [place] [address]  PHONE: [phone]      25    NAME:  Edd Esparza  : 1960  MRN: [id number]         SUBJECTIVE:   Edd Esparza is a 65 y.o. male seen for follow up of:      Chief Complaint   Patient presents with    Coronary Artery Disease        Cardiac Hx (Reviewed and summarized by me):  1) CAD  18 - NSTEMI pci to RCA (Green) 3.0 x 38 Kashif, 3.5 x 18 Kashif   2) Lipids              20 - HDL 56, LDL 52, Trig 119              11/15/21 - HDL 47, LDL 52, Trig 252              22 - HDL 44, LDL 43, Trig 97              10/31/22 - HDL 49, LDL 45, Trig 98              23 - HDL 51, LDL 64, Trig 75, Ratio 2.6              14 - HDL 59, Trig 156 on biometric screening   24 - HDL 62, LDL 57, Trig 71, Ratio 2.2  3) HTN      HPI:  Doing well.  Working with a  to change his diet and exercise habits.  Last lipid panel was reviewed.  His blood pressure is well-controlled.  He denies cardiac symptoms.    Past Medical History, Past Surgical History, Family history, Social History, and Medications were all reviewed with the patient today and updated as necessary.       Current Outpatient Medications:     carvedilol (COREG) 3.125 MG tablet, TAKE 1 TABLET BY MOUTH TWICE DAILY WITH MEALS, Disp: 180 tablet, Rfl: 3    atorvastatin (LIPITOR) 80 MG tablet, TAKE 1 TABLET BY MOUTH DAILY, Disp: 90 tablet, Rfl: 3    Potassium 99 MG TABS, Take by mouth, Disp: , Rfl:     Omega-3 Fatty Acids (FISH OIL) 1200 MG CAPS, , Disp: , Rfl:     ipratropium (ATROVENT) 0.06 % nasal spray, , Disp: , Rfl:     Ascorbic Acid (VITAMIN C) 1000 MG tablet, Take 1 tablet by mouth daily, Disp: , Rfl:     Turmeric 400 MG CAPS, Take by mouth daily, Disp: , Rfl:     GLUCOSAMINE-CHONDROITIN PO, Take by mouth daily, Disp: , Rfl:     QUERCETIN PO, Take by mouth daily, Disp: , Rfl:     KETOCONAZOLE, TOPICAL, 1 % SHAM, , Disp: , Rfl:     magnesium 200 MG

## 2025-04-23 RX ORDER — ATORVASTATIN CALCIUM 80 MG/1
80 TABLET, FILM COATED ORAL DAILY
Qty: 90 TABLET | Refills: 3 | Status: SHIPPED | OUTPATIENT
Start: 2025-04-23

## 2025-04-23 NOTE — TELEPHONE ENCOUNTER
Last office visit 4/9/25.    Requested Prescriptions     Pending Prescriptions Disp Refills    atorvastatin (LIPITOR) 80 MG tablet [Pharmacy Med Name: ATORVASTATIN 80MG TABLETS] 90 tablet 3     Sig: TAKE 1 TABLET BY MOUTH DAILY

## 2025-05-21 ENCOUNTER — PATIENT MESSAGE (OUTPATIENT)
Age: 65
End: 2025-05-21

## 2025-05-21 RX ORDER — NITROGLYCERIN 0.4 MG/1
0.4 TABLET SUBLINGUAL EVERY 5 MIN PRN
Qty: 25 TABLET | Refills: 5 | Status: SHIPPED | OUTPATIENT
Start: 2025-05-21

## 2025-05-21 NOTE — TELEPHONE ENCOUNTER
Last office visit 4/9/2025.    Requested Prescriptions     Pending Prescriptions Disp Refills    nitroGLYCERIN (NITROSTAT) 0.4 MG SL tablet 25 tablet 5     Sig: Place 1 tablet under the tongue every 5 minutes as needed for Chest pain

## 2025-09-02 RX ORDER — CARVEDILOL 3.12 MG/1
3.12 TABLET ORAL 2 TIMES DAILY WITH MEALS
Qty: 180 TABLET | Refills: 3 | Status: SHIPPED | OUTPATIENT
Start: 2025-09-02

## (undated) DEVICE — 2000CC GUARDIAN II: Brand: GUARDIAN

## (undated) DEVICE — TRAY CATH 16F DRN BG LTX -- CONVERT TO ITEM 363158

## (undated) DEVICE — [HIGH FLOW INSUFFLATOR,  DO NOT USE IF PACKAGE IS DAMAGED,  KEEP DRY,  KEEP AWAY FROM SUNLIGHT,  PROTECT FROM HEAT AND RADIOACTIVE SOURCES.]: Brand: PNEUMOSURE

## (undated) DEVICE — DRAPE TWL SURG 16X26IN BLU ORB04] ALLCARE INC]

## (undated) DEVICE — SOL ANTI-FOG 6ML MEDC -- MEDICHOICE - CONVERT TO 358427

## (undated) DEVICE — SUTURE VCRL SZ 0 L27IN ABSRB VLT L26MM CT-2 1/2 CIR J334H

## (undated) DEVICE — MASTISOL ADHESIVE LIQ 2/3ML

## (undated) DEVICE — UTILITY MARKER,BLACK WITH LABELS: Brand: DEVON

## (undated) DEVICE — BLADELESS OPTICAL TROCAR WITH FIXATION CANNULA: Brand: VERSAPORT

## (undated) DEVICE — LAP CHOLE: Brand: MEDLINE INDUSTRIES, INC.

## (undated) DEVICE — BUTTON SWITCH PENCIL BLADE ELECTRODE, HOLSTER: Brand: EDGE

## (undated) DEVICE — REM POLYHESIVE ADULT PATIENT RETURN ELECTRODE: Brand: VALLEYLAB

## (undated) DEVICE — DISSECTOR ENDOSCP EXTRA VW OVL DISTENSION BLLN

## (undated) DEVICE — BLADE ASSEMB CLP HAIR FINE --

## (undated) DEVICE — NEEDLE HYPO 21GA L1.5IN INTRAMUSCULAR S STL LATCH BVL UP

## (undated) DEVICE — BLUNT TIP TROCAR: Brand: AUTO SUTURE

## (undated) DEVICE — (D)STRIP SKN CLSR 0.5X4IN WHT --

## (undated) DEVICE — SYR IRR BLB 2OZ DISP BLU STRL -- CONVERT TO ITEM 357637

## (undated) DEVICE — (D)PREP SKN CHLRAPRP APPL 26ML -- CONVERT TO ITEM 371833

## (undated) DEVICE — KENDALL SCD EXPRESS SLEEVES, KNEE LENGTH, MEDIUM: Brand: KENDALL SCD

## (undated) DEVICE — SUTURE VCRL SZ 4-0 L18IN ABSRB UD L19MM PS-2 3/8 CIR PRIM J496H

## (undated) DEVICE — INTENDED FOR TISSUE SEPARATION, AND OTHER PROCEDURES THAT REQUIRE A SHARP SURGICAL BLADE TO PUNCTURE OR CUT.: Brand: BARD-PARKER SAFETY BLADES SIZE 11, STERILE

## (undated) DEVICE — STAPLER INT DIA5MM 25 ABSRB STRP FIX DISP FOR HERN MESH